# Patient Record
(demographics unavailable — no encounter records)

---

## 2024-10-24 NOTE — HISTORY OF PRESENT ILLNESS
[FreeTextEntry8] : Patient comes in because of cough with yellow thick sputum for approximately 8 days.  He went to the hospital and he was treated but he became slightly better and then he  lapsed.  He went yesterday to the emergency room and they gave him Mucinex but he continues being symptomatic.  She has also some wheezing.  No chest pain, shortness of breath, fever, paroxysmal nocturnal dyspnea or orthopnea

## 2024-10-24 NOTE — ASSESSMENT
[FreeTextEntry1] : Diagnosed #1 COPD exacerbation, bronchitis acute.  To be on p.o. antibiotics and steroids for 6 days.  Also drink lots of warm liquids.  If with any chest pain, shortness of breath to call or come back stat. 2.  Coronary artery disease, stable. 3.  Hypertension, stable. Plan return after a week if not better or earlier if worse.

## 2024-10-24 NOTE — PHYSICAL EXAM
[No Respiratory Distress] : no respiratory distress  [No Accessory Muscle Use] : no accessory muscle use [Normal Percussion] : the chest was normal to percussion [Pedal Pulses Present] : the pedal pulses are present [No Edema] : there was no peripheral edema [No Extremity Clubbing/Cyanosis] : no extremity clubbing/cyanosis [Normal] : affect was normal and insight and judgment were intact [de-identified] : With bilateral minimal wheezing and few rhonchi

## 2024-10-31 NOTE — HISTORY OF PRESENT ILLNESS
[FreeTextEntry1] : Patient presents today for diabetic pedal exam.  He states that he was in [place] and returned not feeling too well but overall, he states that he is doing OK.   Last hemoglobin A1c was 7.1% in May.  He has not seen his doctor since he returned from [place].  He denies any intermittent claudication type symptoms or tingling or numbness in his feet.  He presents wearing a loafer type shoe and states that he has elongated nails that he cannot cut himself.

## 2024-10-31 NOTE — HISTORY OF PRESENT ILLNESS
[Former] : former [>= 20 pack years] : >= 20 pack years [TextBox_4] : 84-year-old male with history of OAD, lung CVA presents for follow-up.  Patient was discharged from the hospital 2 weeksago where he was admitted for SOB and hypoxemia, treated for COPD exacerbation secondary to viral infection.  Chest x-ray at the time was negative.  Patient was discharged on tapering oral steroids with continued use of Breztri twice daily and albuterol as needed.  Patient continues to cough however with occasional SOB.  He denies fever, chills, chest pain, hemoptysis, night sweats or weight loss. [YearQuit] : 2000 [TextBox_29] : Denies snoring, daytime somnolence, apneic episodes, AM headaches

## 2024-10-31 NOTE — REVIEW OF SYSTEMS
[Cough] : cough [Sputum] : sputum [Dyspnea] : dyspnea [SOB on Exertion] : sob on exertion [GERD] : gerd [Negative] : Endocrine

## 2024-10-31 NOTE — PHYSICAL EXAM
[Varicose Veins Of Lower Extremities] : bilaterally [Ankle Swelling On The Right] : mild [1+] : left foot dorsalis pedis 1+ [Skin Color & Pigmentation] : normal skin color and pigmentation [Skin Turgor] : normal skin turgor [] : no rash [Skin Lesions] : no skin lesions [Ankle Swelling (On Exam)] : not present [FreeTextEntry3] : CFT: 3 seconds x10. Temperature gradient; within normal limits. [de-identified] : Muscle strength: 5/5, bilateral.  Mild hammertoe deformities 2 to 4, bilateral.   [Foot Ulcer] : no foot ulcer [Skin Induration] : no skin induration [FreeTextEntry4] : decreased at the 1st MPJ [FreeTextEntry1] : Light touch is intact, bilaterally.  5.07 [place]-Griselda monofilament wire is intact, bilateral.  [FreeTextEntry8] : decreased at the 1st MPJ

## 2024-10-31 NOTE — ASSESSMENT
[FreeTextEntry1] : Impression: Nail dystrophy (L60.3).  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  Treatment: Discussed findings and conditions with the patient.  He was encouraged to moisturize his feet daily; avoid moisturizing interdigitally.  Avoid walking barefoot.  Wear shoe gear in the garden.  Continue wearing properly fitting shoe gear.  He presents today wearing loafers with no socks.  Discussed use of socks.  All nails were prepped and trimmed to appropriate hygienic length.  He is to manually inspect his feet daily.   Any pain, problems or concerns, he is to contact the office.  Discussed the importance of glycemic control.

## 2024-10-31 NOTE — DISCUSSION/SUMMARY
[FreeTextEntry1] : 84-year-old male with history of COPD with related symptoms post exacerbation secondary to viral infection.  I reviewed the chest x-ray images that were performed while in the hospital and discussed the findings with the patient.  Given persistent complaints and findings prednisone 40 mg daily for 5 days was prescribed.  He is to continue Breztri and albuterol as before.  Follow-up chest CT as planned in the future.  He is to follow-up with his PMD as before.

## 2024-10-31 NOTE — PHYSICAL EXAM
[No Acute Distress] : no acute distress [Normal Oropharynx] : normal oropharynx [Normal Appearance] : normal appearance [No Neck Mass] : no neck mass [Normal Rate/Rhythm] : normal rate/rhythm [Normal S1, S2] : normal s1, s2 [No Murmurs] : no murmurs [No Resp Distress] : no resp distress [Rhonchi] : rhonchi [Surgical scars] : surgical scars [Benign] : benign [Normal Gait] : normal gait [No Clubbing] : no clubbing [No Cyanosis] : no cyanosis [No Edema] : no edema [FROM] : FROM [Normal Color/ Pigmentation] : normal color/ pigmentation [No Focal Deficits] : no focal deficits [Oriented x3] : oriented x3 [Normal Affect] : normal affect [TextBox_68] : Decreased breath sounds bilaterally

## 2024-10-31 NOTE — PHYSICAL EXAM
[Varicose Veins Of Lower Extremities] : bilaterally [Ankle Swelling On The Right] : mild [1+] : left foot dorsalis pedis 1+ [Skin Color & Pigmentation] : normal skin color and pigmentation [Skin Turgor] : normal skin turgor [] : no rash [Skin Lesions] : no skin lesions [Ankle Swelling (On Exam)] : not present [FreeTextEntry3] : CFT: 3 seconds x10. Temperature gradient; within normal limits. [de-identified] : Muscle strength: 5/5, bilateral.  Mild hammertoe deformities 2 to 4, bilateral.   [Foot Ulcer] : no foot ulcer [Skin Induration] : no skin induration [FreeTextEntry4] : decreased at the 1st MPJ [FreeTextEntry1] : Light touch is intact, bilaterally.  5.07 [place]-Griselda monofilament wire is intact, bilateral.  [FreeTextEntry8] : decreased at the 1st MPJ

## 2024-11-06 NOTE — ASSESSMENT
[FreeTextEntry1] : Mr. KD JORGE, 84 year old male, smoker, w/ hx of HTN, DMT2, TIA, mild MI in 2000 s/p 1 stent, bladder CA s/p removal in 2018, skin CA (Rt forearm) s/p excision 05/2023, and lung CA.  Now 1 yr s/p Rt VATS, R.A., RULobectomy, MLND on 10/4/23. 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  Patient returned from [facility] on Friday (10/11) and was admitted on 10/15/2024 for COPD exacerbation and severe sepsis 2/2 Rhinovirus infection. Patient was started on azithromycin, duonebs, prednisone, and mucinex for COPD exacerbation. F/u with PCP on 10/24/2024, he became slightly better after discharge and then he lapsed. He went to the emergency room on 10/23/2024 and they gave him Mucinex but he continues being symptomatic. He has also some wheezing. PCP gave a course of Azithromycin and Prednisone. On 10/31/2024, she f/u with Dr. Heller, Given persistent complaints and findings prednisone 40 mg daily for 5 days was prescribed.  CT chest on 11/06/2024:  -   I have reviewed the patient's medical records and diagnostic images at time of this office consultation and have made the following recommendation: 1.

## 2024-11-06 NOTE — HISTORY OF PRESENT ILLNESS
[FreeTextEntry1] : Mr. KD JORGE, 84 year old male, former smoker, w/ hx of HTN, DMT2, TIA, mild MI in 2000 s/p 1 stent, bladder CA s/p removal in 2018, skin CA (Rt forearm) s/p excision 05/2023, who presented to PCP c/o SOB, sent for CXR then CT scan.  PET/CT on 2/13/23: - FDG-avid AP window lymph node, SUV 4.6, 1.2 x 0.8 cm, image 86. - FDG-avid left hilar focus on the SUV 2.9, difficult to measure on corresponding CT, image 95. - No FDG-avid right hilar lymphadenopathy evident. - FDG-avid RUL groundglass opacity, SUV 4.2 in the more solid component anteriorly, measuring approximately 4.2 x 2.9 cm, image 77. - Emphysema.  MRI, MRA of the brain, and MRA of the neck on 5/19/23: - MRI BRAIN: No acute infarction. No abnormal enhancement. - MRA BRAIN: Unremarkable. - MRA NECK: Limited study. Unremarkable.  CT Chest on 6/6/23 at [facility]: - Mild interstitial disease is noted, manifested by fine peripheral and groundglass opacities, slightly worse at the lung bases, not significantly changed. - Mild to moderate degree of emphysematous changes are visualized, worse involving the upper lobes. Multiple bilateral calcified granulomas are seen. - a 4.5cm groundglass opacity is visualized within the RUL laterally, which is difficult to measure due to its irregular shape and ill-defined nature, (4: 93), not significantly changed in size. Possible solid components is visualized, measuring approximately 5 mm (4: 89), not definitively seen previously. - a right adrenal nodule is visualized, measuring 1.7 cm and 8 Hounsfield units, compatible with an adenoma, not particularly changed in size, which is benign, not requiring follow-up examinations.  PFTs on 7/17/23: FVC 81%, FEV1 49%  He is s/p Navigational Bronch EBUS biopsy on 8/8/23 by Dr. Schwartz. Path revealed AdenoCA of the RUL lesion. PD-L1 0%. Level 10 LN non-diagnostic. RUL BAL showed atypical findings.  PFTs on 8/22/23: FVC 60%, FEV1 63%, DLCO 73%  Now 1 yr s/p Rt VATS, R.A., RULobectomy, MLND on 10/4/23. Path revealed invasive papillary adenocarcinoma, 3.5 cm, G2, all margins and LNs are negative, [id number], Stg IA3.  CXR on 10/25/23: Small right hydropneumothorax at the right apex. Increased small right pleural effusion.  CXR on 12/7/23: Small loculated right effusion unchanged from the last study although the air-fluid level in the right apex no longer seen with just an opacity remaining likely pleural thickening.  Of note, patient s/p PPM placement beginning of Feb 2024.  CT Angio Chest w/wo on 2/16/24 at [facility]: - no P.E. - post-op changes  - Rt pleural effusion - peribronchial soft tissue thickening in the Rt anterior hilum, suspicious  PET/CT on 4/11/2024: - RUL post surgical changes and scarring  - along with suture line, there is tubular shaped soft tissue nodule on image 58 measuring 2.3x1.5 cm with SUV max 2.4 - there is also linear opacities at the right lower lung which could reflect scarring and additional postsurgical changes with SUV max 2 - small right pleural effusion - 2 mm left upper lobe solid nodule image 59 - emphysema  On 05/02/2024, PET scan reviewed and discussed with patient, stable postop findings. Recommended to repeat CT scan in 6 months for surveillance.   Patient returned from [facility] on Friday (10/11) and was admitted on 10/15/2024 for COPD exacerbation and severe sepsis 2/2 Rhinovirus infection. Patient was started on azithromycin, duonebs, prednisone, and mucinex for COPD exacerbation. F/u with PCP on 10/24/2024, he became slightly better after discharge and then he lapsed. He went to the emergency room on 10/23/2024 and they gave him Mucinex but he continues being symptomatic. He has also some wheezing. PCP gave a course of Azithromycin and Prednisone. On 10/31/2024, she f/u with Dr. Heller, Given persistent complaints and findings prednisone 40 mg daily for 5 days was prescribed.  CT chest on 11/06/2024:  -   Patient is here today for a follow up.

## 2024-11-06 NOTE — CONSULT LETTER
[Consult Letter:] : I had the pleasure of evaluating your patient, [unfilled]. [( Thank you for referring [unfilled] for consultation for _____ )] : Thank you for referring [unfilled] for consultation for [unfilled] [Please see my note below.] : Please see my note below. [Consult Closing:] : Thank you very much for allowing me to participate in the care of this patient.  If you have any questions, please do not hesitate to contact me. [Sincerely,] : Sincerely, [FreeTextEntry2] : Dr. Michael Schwartz (IP/Referring) Dr. Jacoby Heller (Pulm) Dr. Anuradha Sanchez (Hem/Onc) Dr. Edwni Ramos (Uro) Dr. Analilia Tavarez (PCP) [FreeTextEntry3] : Asad Ace MD, MPH  System Director of Thoracic Surgery  Director of Comprehensive Lung and Foregut [place]  Professor Cardiovascular & Thoracic Surgery   [employer] at [facility] [phone] [place] [address] [place] [address] Tel: [phone] Fax: [phone]

## 2024-11-14 NOTE — ASSESSMENT
[FreeTextEntry1] : Mr. KD JORGE, 84 year old male, smoker, w/ hx of HTN, DMT2, TIA, mild MI in 2000 s/p 1 stent, bladder CA s/p removal in 2018, skin CA (Rt forearm) s/p excision 05/2023, and lung CA.  Now 1 yr s/p Rt VATS, R.A., RULobectomy, MLND on 10/4/23. Path revealed invasive papillary adenocarcinoma, 3.5 cm, G2, all margins and LNs are negative, [id number], Stg IA3.   I have reviewed the patient's medical records and diagnostic images at time of this office consultation and have made the following recommendation: 1. Awaiting official read on CT scan 2. RTC in 6 months with CT Chest w/o contrast     I, Dr. DIXON, AICHA AYALA, personally performed the evaluation and management (E/M) services for this established patient who presents today with (a) new problem(s)/exacerbation of (an) existing condition(s). That E/M includes conducting the examination, assessing all new/exacerbated conditions, and establishing a new plan of care. Today, my ACP, Jeremy Schneider NP was here to observe my evaluation and management services for this new problem/exacerbated condition to be followed going forward.

## 2024-11-14 NOTE — CONSULT LETTER
[FreeTextEntry2] : Dr. Michael Schwartz (IP/Referring) Dr. Jacoby Heller (Pulm) Dr. Anuradha Sanchez (Hem/Onc) Dr. Edwin Ramos (Uro) Dr. Analilia Tavarez (PCP) [FreeTextEntry3] : Asad Ace MD, MPH  System Director of Thoracic Surgery  Director of Comprehensive Lung and Foregut [place]  Professor Cardiovascular & Thoracic Surgery   [facility] [employer] at [facility] [phone] [place] [address] [place] [address] Tel: [phone] Fax: [phone]

## 2024-11-14 NOTE — HISTORY OF PRESENT ILLNESS
[FreeTextEntry1] : Mr. KD JORGE, 84 year old male, former smoker, w/ hx of HTN, DMT2, TIA, mild MI in 2000 s/p 1 stent, bladder CA s/p removal in 2018, skin CA (Rt forearm) s/p excision 05/2023, who presented to PCP c/o SOB, sent for CXR then CT scan.  PET/CT on 2/13/23: - FDG-avid AP window lymph node, SUV 4.6, 1.2 x 0.8 cm, image 86. - FDG-avid left hilar focus on the SUV 2.9, difficult to measure on corresponding CT, image 95. - No FDG-avid right hilar lymphadenopathy evident. - FDG-avid RUL groundglass opacity, SUV 4.2 in the more solid component anteriorly, measuring approximately 4.2 x 2.9 cm, image 77. - Emphysema.  MRI, MRA of the brain, and MRA of the neck on 5/19/23: - MRI BRAIN: No acute infarction. No abnormal enhancement. - MRA BRAIN: Unremarkable. - MRA NECK: Limited study. Unremarkable.  CT Chest on 6/6/23 at [facility]: - Mild interstitial disease is noted, manifested by fine peripheral and groundglass opacities, slightly worse at the lung bases, not significantly changed. - Mild to moderate degree of emphysematous changes are visualized, worse involving the upper lobes. Multiple bilateral calcified granulomas are seen. - a 4.5cm groundglass opacity is visualized within the RUL laterally, which is difficult to measure due to its irregular shape and ill-defined nature, (4: 93), not significantly changed in size. Possible solid components is visualized, measuring approximately 5 mm (4: 89), not definitively seen previously. - a right adrenal nodule is visualized, measuring 1.7 cm and 8 Hounsfield units, compatible with an adenoma, not particularly changed in size, which is benign, not requiring follow-up examinations.  PFTs on 7/17/23: FVC 81%, FEV1 49%  He is s/p Navigational Bronch EBUS biopsy on 8/8/23 by Dr. Schwartz. Path revealed AdenoCA of the RUL lesion. PD-L1 0%. Level 10 LN non-diagnostic. RUL BAL showed atypical findings.  PFTs on 8/22/23: FVC 60%, FEV1 63%, DLCO 73%  Now 1 yr s/p Rt VATS, R.A., RULobectomy, MLND on 10/4/23. Path revealed invasive papillary adenocarcinoma, 3.5 cm, G2, all margins and LNs are negative, [id number], Stg IA3.  Of note, patient s/p PPM placement beginning of Feb 2024.  CT Angio Chest w/wo on 2/16/24 at [facility]: - no P.E. - post-op changes  - Rt pleural effusion - peribronchial soft tissue thickening in the Rt anterior hilum, suspicious  PET/CT on 4/11/2024: - RUL post surgical changes and scarring  - along with suture line, there is tubular shaped soft tissue nodule on image 58 measuring 2.3x1.5 cm with SUV max 2.4 - there is also linear opacities at the right lower lung which could reflect scarring and additional postsurgical changes with SUV max 2 - small right pleural effusion - 2 mm left upper lobe solid nodule image 59 - emphysema  On 05/02/2024, PET scan reviewed and discussed with patient, stable postop findings. Recommended to repeat CT scan in 6 months for surveillance.   Patient returned from [facility] on Friday (10/11) and was admitted on 10/15/2024 for COPD exacerbation and severe sepsis 2/2 Rhinovirus infection. Patient was started on azithromycin, duonebs, prednisone, and mucinex for COPD exacerbation. F/u with PCP on 10/24/2024, he became slightly better after discharge and then he lapsed. He went to the emergency room on 10/23/2024 and they gave him Mucinex but he continues being symptomatic. He has also some wheezing. PCP gave a course of Azithromycin and Prednisone. On 10/31/2024, she f/u with Dr. Heller, Given persistent complaints and findings prednisone 40 mg daily for 5 days was prescribed.  CT chest on 11/06/2024:  - official report pending  Patient is here today for a follow up.  patient came back from [facility], and had a cold/cough, went to [facility] ED, hospitalized for pleural effusion, s/p IV antibiotics and steroid, had followed up with Dr. Heller.

## 2024-11-14 NOTE — ASSESSMENT
[FreeTextEntry1] : Mr. KD JORGE, 84 year old male, smoker, w/ hx of HTN, DMT2, TIA, mild MI in 2000 s/p 1 stent, bladder CA s/p removal in 2018, skin CA (Rt forearm) s/p excision 05/2023, and lung CA.  Now 1 yr s/p Rt VATS, R.A., RULobectomy, MLND on 10/4/23. Path revealed invasive papillary adenocarcinoma, 3.5 cm, G2, all margins and LNs are negative, [id number], Stg IA3.   I have reviewed the patient's medical records and diagnostic images at time of this office consultation and have made the following recommendation: 1. Awaiting official read on CT scan 2. RTC in 6 months with CT Chest w/o contrast     I, Dr. DIXON, AICHA AYALA, personally performed the evaluation and management (E/M) services for this established patient who presents today with (a) new problem(s)/exacerbation of (an) existing condition(s). That E/M includes conducting the examination, assessing all new/exacerbated conditions, and establishing a new plan of care. Today, my ACP, Jeremy Schneider NP was here to observe my evaluation and management services for this new problem/exacerbated condition to be followed going forward.

## 2024-11-14 NOTE — ASSESSMENT
[FreeTextEntry1] : Mr. KD JORGE, 84 year old male, smoker, w/ hx of HTN, DMT2, TIA, mild MI in 2000 s/p 1 stent, bladder CA s/p removal in 2018, skin CA (Rt forearm) s/p excision 05/2023, and lung CA.  Now 1 yr s/p Rt VATS, R.A., RULobectomy, MLND on 10/4/23. Path revealed invasive papillary adenocarcinoma, 3.5 cm, G2, all margins and LNs are negative, [id number], Stg IA3.   I have reviewed the patient's medical records and diagnostic images at time of this office consultation and have made the following recommendation: 1. Awaiting official read on CT scan 2. RTC in 6 months with CT Chest w/o contrast     I, Dr. DIXON, AICHA AYALA, personally performed the evaluation and management (E/M) services for this established patient who presents today with (a) new problem(s)/exacerbation of (an) existing condition(s). That E/M includes conducting the examination, assessing all new/exacerbated conditions, and establishing a new plan of care. Today, my ACP, Jeremy Schneider NP was here to observe my evaluation and management services for this new problem/exacerbated condition to be followed going forward.

## 2024-11-14 NOTE — HISTORY OF PRESENT ILLNESS
[FreeTextEntry1] : Mr. KD JORGE, 84 year old male, former smoker, w/ hx of HTN, DMT2, TIA, mild MI in 2000 s/p 1 stent, bladder CA s/p removal in 2018, skin CA (Rt forearm) s/p excision 05/2023, who presented to PCP c/o SOB, sent for CXR then CT scan.  PET/CT on 2/13/23: - FDG-avid AP window lymph node, SUV 4.6, 1.2 x 0.8 cm, image 86. - FDG-avid left hilar focus on the SUV 2.9, difficult to measure on corresponding CT, image 95. - No FDG-avid right hilar lymphadenopathy evident. - FDG-avid RUL groundglass opacity, SUV 4.2 in the more solid component anteriorly, measuring approximately 4.2 x 2.9 cm, image 77. - Emphysema.  MRI, MRA of the brain, and MRA of the neck on 5/19/23: - MRI BRAIN: No acute infarction. No abnormal enhancement. - MRA BRAIN: Unremarkable. - MRA NECK: Limited study. Unremarkable.  CT Chest on 6/6/23 at [facility]: - Mild interstitial disease is noted, manifested by fine peripheral and groundglass opacities, slightly worse at the lung bases, not significantly changed. - Mild to moderate degree of emphysematous changes are visualized, worse involving the upper lobes. Multiple bilateral calcified granulomas are seen. - a 4.5cm groundglass opacity is visualized within the RUL laterally, which is difficult to measure due to its irregular shape and ill-defined nature, (4: 93), not significantly changed in size. Possible solid components is visualized, measuring approximately 5 mm (4: 89), not definitively seen previously. - a right adrenal nodule is visualized, measuring 1.7 cm and 8 Hounsfield units, compatible with an adenoma, not particularly changed in size, which is benign, not requiring follow-up examinations.  PFTs on 7/17/23: FVC 81%, FEV1 49%  He is s/p Navigational Bronch EBUS biopsy on 8/8/23 by Dr. Schwartz. Path revealed AdenoCA of the RUL lesion. PD-L1 0%. Level 10 LN non-diagnostic. RUL BAL showed atypical findings.  PFTs on 8/22/23: FVC 60%, FEV1 63%, DLCO 73%  Now 1 yr s/p Rt VATS, R.A., RULobectomy, MLND on 10/4/23. Path revealed invasive papillary adenocarcinoma, 3.5 cm, G2, all margins and LNs are negative, [id number], Stg IA3.  Of note, patient s/p PPM placement beginning of Feb 2024.  CT Angio Chest w/wo on 2/16/24 at [place]: - no P.E. - post-op changes  - Rt pleural effusion - peribronchial soft tissue thickening in the Rt anterior hilum, suspicious  PET/CT on 4/11/2024: - RUL post surgical changes and scarring  - along with suture line, there is tubular shaped soft tissue nodule on image 58 measuring 2.3x1.5 cm with SUV max 2.4 - there is also linear opacities at the right lower lung which could reflect scarring and additional postsurgical changes with SUV max 2 - small right pleural effusion - 2 mm left upper lobe solid nodule image 59 - emphysema  On 05/02/2024, PET scan reviewed and discussed with patient, stable postop findings. Recommended to repeat CT scan in 6 months for surveillance.   Patient returned from [facility] on Friday (10/11) and was admitted on 10/15/2024 for COPD exacerbation and severe sepsis 2/2 Rhinovirus infection. Patient was started on azithromycin, duonebs, prednisone, and mucinex for COPD exacerbation. F/u with PCP on 10/24/2024, he became slightly better after discharge and then he lapsed. He went to the emergency room on 10/23/2024 and they gave him Mucinex but he continues being symptomatic. He has also some wheezing. PCP gave a course of Azithromycin and Prednisone. On 10/31/2024, she f/u with Dr. Heller, Given persistent complaints and findings prednisone 40 mg daily for 5 days was prescribed.  CT chest on 11/06/2024:  - official report pending  Patient is here today for a follow up.  patient came back from [facility], and had a cold/cough, went to [facility] ED, hospitalized for pleural effusion, s/p IV antibiotics and steroid, had followed up with Dr. Heller.

## 2024-11-14 NOTE — CONSULT LETTER
[FreeTextEntry2] : Dr. Michael Schwartz (IP/Referring) Dr. Jacoby Heller (Pulm) Dr. Anuradha Sanchez (Hem/Onc) Dr. Edwin Ramos (Uro) Dr. Analilia Tavarez (PCP) [FreeTextEntry3] : Asad Ace MD, MPH  System Director of Thoracic Surgery  Director of Comprehensive Lung and Foregut [place]  Professor Cardiovascular & Thoracic Surgery   [facility] [employer] at [facility] [address] [place] [address] [place] [address] Tel: [phone] Fax: [phone]

## 2024-11-14 NOTE — ASSESSMENT
[FreeTextEntry1] : Mr. KD JORGE, 84 year old male, smoker, w/ hx of HTN, DMT2, TIA, mild MI in 2000 s/p 1 stent, bladder CA s/p removal in 2018, skin CA (Rt forearm) s/p excision 05/2023, and lung CA.  Now 1 yr s/p Rt VATS, R.A., RULobectomy, MLND on 10/4/23. Path revealed invasive papillary adenocarcinoma, 3.5 cm, G2, all margins and LNs are negative, [id number], Stg IA3.   I have reviewed the patient's medical records and diagnostic images at time of this office consultation and have made the following recommendation: 1. Awaiting official read on CT scan 2. RTC in 6 months with CT Chest w/o contrast     I, Dr. DIXON, AICHA AYALA, personally performed the evaluation and management (E/M) services for this established patient who presents today with (a) new problem(s)/exacerbation of (an) existing condition(s). That E/M includes conducting the examination, assessing all new/exacerbated conditions, and establishing a new plan of care. Today, my ACP, Jeremy Schneider NP was here to observe my evaluation and management services for this new problem/exacerbated condition to be followed going forward.

## 2024-11-14 NOTE — HISTORY OF PRESENT ILLNESS
[FreeTextEntry1] : Mr. KD JORGE, 84 year old male, former smoker, w/ hx of HTN, DMT2, TIA, mild MI in 2000 s/p 1 stent, bladder CA s/p removal in 2018, skin CA (Rt forearm) s/p excision 05/2023, who presented to PCP c/o SOB, sent for CXR then CT scan.  PET/CT on 2/13/23: - FDG-avid AP window lymph node, SUV 4.6, 1.2 x 0.8 cm, image 86. - FDG-avid left hilar focus on the SUV 2.9, difficult to measure on corresponding CT, image 95. - No FDG-avid right hilar lymphadenopathy evident. - FDG-avid RUL groundglass opacity, SUV 4.2 in the more solid component anteriorly, measuring approximately 4.2 x 2.9 cm, image 77. - Emphysema.  MRI, MRA of the brain, and MRA of the neck on 5/19/23: - MRI BRAIN: No acute infarction. No abnormal enhancement. - MRA BRAIN: Unremarkable. - MRA NECK: Limited study. Unremarkable.  CT Chest on 6/6/23 at [facility]: - Mild interstitial disease is noted, manifested by fine peripheral and groundglass opacities, slightly worse at the lung bases, not significantly changed. - Mild to moderate degree of emphysematous changes are visualized, worse involving the upper lobes. Multiple bilateral calcified granulomas are seen. - a 4.5cm groundglass opacity is visualized within the RUL laterally, which is difficult to measure due to its irregular shape and ill-defined nature, (4: 93), not significantly changed in size. Possible solid components is visualized, measuring approximately 5 mm (4: 89), not definitively seen previously. - a right adrenal nodule is visualized, measuring 1.7 cm and 8 Hounsfield units, compatible with an adenoma, not particularly changed in size, which is benign, not requiring follow-up examinations.  PFTs on 7/17/23: FVC 81%, FEV1 49%  He is s/p Navigational Bronch EBUS biopsy on 8/8/23 by Dr. Schwartz. Path revealed AdenoCA of the RUL lesion. PD-L1 0%. Level 10 LN non-diagnostic. RUL BAL showed atypical findings.  PFTs on 8/22/23: FVC 60%, FEV1 63%, DLCO 73%  Now 1 yr s/p Rt VATS, R.A., RULobectomy, MLND on 10/4/23. Path revealed invasive papillary adenocarcinoma, 3.5 cm, G2, all margins and LNs are negative, [id number], Stg IA3.  Of note, patient s/p PPM placement beginning of Feb 2024.  CT Angio Chest w/wo on 2/16/24 at [place]: - no P.E. - post-op changes  - Rt pleural effusion - peribronchial soft tissue thickening in the Rt anterior hilum, suspicious  PET/CT on 4/11/2024: - RUL post surgical changes and scarring  - along with suture line, there is tubular shaped soft tissue nodule on image 58 measuring 2.3x1.5 cm with SUV max 2.4 - there is also linear opacities at the right lower lung which could reflect scarring and additional postsurgical changes with SUV max 2 - small right pleural effusion - 2 mm left upper lobe solid nodule image 59 - emphysema  On 05/02/2024, PET scan reviewed and discussed with patient, stable postop findings. Recommended to repeat CT scan in 6 months for surveillance.   Patient returned from [facility] on Friday (10/11) and was admitted on 10/15/2024 for COPD exacerbation and severe sepsis 2/2 Rhinovirus infection. Patient was started on azithromycin, duonebs, prednisone, and mucinex for COPD exacerbation. F/u with PCP on 10/24/2024, he became slightly better after discharge and then he lapsed. He went to the emergency room on 10/23/2024 and they gave him Mucinex but he continues being symptomatic. He has also some wheezing. PCP gave a course of Azithromycin and Prednisone. On 10/31/2024, she f/u with Dr. Heller, Given persistent complaints and findings prednisone 40 mg daily for 5 days was prescribed.  CT chest on 11/06/2024:  - official report pending  Patient is here today for a follow up.  patient came back from [facility], and had a cold/cough, went to [facility] ED, hospitalized for pleural effusion, s/p IV antibiotics and steroid, had followed up with Dr. Heller.

## 2024-11-14 NOTE — HISTORY OF PRESENT ILLNESS
[FreeTextEntry1] : Mr. KD JORGE, 84 year old male, former smoker, w/ hx of HTN, DMT2, TIA, mild MI in 2000 s/p 1 stent, bladder CA s/p removal in 2018, skin CA (Rt forearm) s/p excision 05/2023, who presented to PCP c/o SOB, sent for CXR then CT scan.  PET/CT on 2/13/23: - FDG-avid AP window lymph node, SUV 4.6, 1.2 x 0.8 cm, image 86. - FDG-avid left hilar focus on the SUV 2.9, difficult to measure on corresponding CT, image 95. - No FDG-avid right hilar lymphadenopathy evident. - FDG-avid RUL groundglass opacity, SUV 4.2 in the more solid component anteriorly, measuring approximately 4.2 x 2.9 cm, image 77. - Emphysema.  MRI, MRA of the brain, and MRA of the neck on 5/19/23: - MRI BRAIN: No acute infarction. No abnormal enhancement. - MRA BRAIN: Unremarkable. - MRA NECK: Limited study. Unremarkable.  CT Chest on 6/6/23 at [facility]: - Mild interstitial disease is noted, manifested by fine peripheral and groundglass opacities, slightly worse at the lung bases, not significantly changed. - Mild to moderate degree of emphysematous changes are visualized, worse involving the upper lobes. Multiple bilateral calcified granulomas are seen. - a 4.5cm groundglass opacity is visualized within the RUL laterally, which is difficult to measure due to its irregular shape and ill-defined nature, (4: 93), not significantly changed in size. Possible solid components is visualized, measuring approximately 5 mm (4: 89), not definitively seen previously. - a right adrenal nodule is visualized, measuring 1.7 cm and 8 Hounsfield units, compatible with an adenoma, not particularly changed in size, which is benign, not requiring follow-up examinations.  PFTs on 7/17/23: FVC 81%, FEV1 49%  He is s/p Navigational Bronch EBUS biopsy on 8/8/23 by Dr. Schwartz. Path revealed AdenoCA of the RUL lesion. PD-L1 0%. Level 10 LN non-diagnostic. RUL BAL showed atypical findings.  PFTs on 8/22/23: FVC 60%, FEV1 63%, DLCO 73%  Now 1 yr s/p Rt VATS, R.A., RULobectomy, MLND on 10/4/23. Path revealed invasive papillary adenocarcinoma, 3.5 cm, G2, all margins and LNs are negative, [id number], Stg IA3.  Of note, patient s/p PPM placement beginning of Feb 2024.  CT Angio Chest w/wo on 2/16/24 at [place]: - no P.E. - post-op changes  - Rt pleural effusion - peribronchial soft tissue thickening in the Rt anterior hilum, suspicious  PET/CT on 4/11/2024: - RUL post surgical changes and scarring  - along with suture line, there is tubular shaped soft tissue nodule on image 58 measuring 2.3x1.5 cm with SUV max 2.4 - there is also linear opacities at the right lower lung which could reflect scarring and additional postsurgical changes with SUV max 2 - small right pleural effusion - 2 mm left upper lobe solid nodule image 59 - emphysema  On 05/02/2024, PET scan reviewed and discussed with patient, stable postop findings. Recommended to repeat CT scan in 6 months for surveillance.   Patient returned from [facility] on Friday (10/11) and was admitted on 10/15/2024 for COPD exacerbation and severe sepsis 2/2 Rhinovirus infection. Patient was started on azithromycin, duonebs, prednisone, and mucinex for COPD exacerbation. F/u with PCP on 10/24/2024, he became slightly better after discharge and then he lapsed. He went to the emergency room on 10/23/2024 and they gave him Mucinex but he continues being symptomatic. He has also some wheezing. PCP gave a course of Azithromycin and Prednisone. On 10/31/2024, she f/u with Dr. Heller, Given persistent complaints and findings prednisone 40 mg daily for 5 days was prescribed.  CT chest on 11/06/2024:  - official report pending  Patient is here today for a follow up.  patient came back from [facility], and had a cold/cough, went to [facility] ED, hospitalized for pleural effusion, s/p IV antibiotics and steroid, had followed up with Dr. Heller.

## 2024-11-14 NOTE — CONSULT LETTER
[FreeTextEntry2] : Dr. Michael Schwartz (IP/Referring) Dr. Jacoby Heller (Pulm) Dr. Anuradha Sanchez (Hem/Onc) Dr. Edwin Ramos (Uro) Dr. Analilia Tavarez (PCP) [FreeTextEntry3] : Asad Ace MD, MPH  System Director of Thoracic Surgery  Director of Comprehensive Lung and Foregut [place]  Professor Cardiovascular & Thoracic Surgery   [facility] at [facility] [phone] [place] [address] [place] [address] Tel: [phone] Fax: [phone]

## 2024-11-14 NOTE — CONSULT LETTER
[FreeTextEntry2] : Dr. Michael Schwartz (IP/Referring) Dr. Jacoby Heller (Pulm) Dr. Anuradha Sanchez (Hem/Onc) Dr. Edwin Ramos (Uro) Dr. Analilia Tavarez (PCP) [FreeTextEntry3] : Asad Ace MD, MPH  System Director of Thoracic Surgery  Director of Comprehensive Lung and Foregut [place]  Professor Cardiovascular & Thoracic Surgery   [facility] [employer] [facility] at [facility] [phone] [place] [address] [place] [address] Tel: [phone] Fax: [phone]

## 2025-01-09 NOTE — PHYSICAL EXAM
[Normal] : affect was normal and insight and judgment were intact [de-identified] : Tender and stiff left knee without swelling or redness

## 2025-01-09 NOTE — ASSESSMENT
[FreeTextEntry1] : Diagnosis #1 left knee pain most likely secondary to joint degenerative arthritis.  To take Tylenol 500 mg p.o. every 6 hours as needed and have orthopedic consult 2.  Hypertension, same treatment and low-salt diet.  CMP to lab to check electrolytes and kidney function 3.  Adult onset diabetes, current treatment and diet.  A1c to lab 4.  Coronary artery disease, stable. Plan.  Return after 6 weeks

## 2025-01-09 NOTE — HISTORY OF PRESENT ILLNESS
[FreeTextEntry8] : Patient comes because of left knee pain the last few days.  No swelling redness or known injuries.  He took meloxicam the last 2 days with some improvement but bothers his stomach.  He wants to check also blood pressure and diabetes.  No other symptoms

## 2025-01-24 NOTE — DISCUSSION/SUMMARY
[de-identified] : 84 y/o male with left knee pain.  The patient presents for evaluation of degenerative knee pain. Presentation is consistent with early degenerative change and arthrosis to the knee. Described that this is likely due to overuse, and age-related "wear and tear". Pain may be related to breakdown of the chondral surfaces, cartilage, or ligaments of the knee. Radiographs shows mild tricompartmental OA and chondrocalcinosis.   Recommendation: Begin a trial of PT. Rx given. Conservative care & observation; including rest/activity avoidance until less symptomatic with subsequent gradual return to full low impact activity as tolerated. Patient may also use Meloxicam Rx given or OTC acetaminophen as tolerated, with application of ice/heat to the area 2-3x daily for 20 minutes after periods of activity.   Follow-up as needed if pain persists for further evaluation and treatment.

## 2025-01-24 NOTE — DISCUSSION/SUMMARY
[de-identified] : 84 y/o male with left knee pain.  The patient presents for evaluation of degenerative knee pain. Presentation is consistent with early degenerative change and arthrosis to the knee. Described that this is likely due to overuse, and age-related "wear and tear". Pain may be related to breakdown of the chondral surfaces, cartilage, or ligaments of the knee. Radiographs shows mild tricompartmental OA and chondrocalcinosis.   Recommendation: Begin a trial of PT. Rx given. Conservative care & observation; including rest/activity avoidance until less symptomatic with subsequent gradual return to full low impact activity as tolerated. Patient may also use Meloxicam Rx given or OTC acetaminophen as tolerated, with application of ice/heat to the area 2-3x daily for 20 minutes after periods of activity.   Follow-up as needed if pain persists for further evaluation and treatment.

## 2025-01-24 NOTE — HISTORY OF PRESENT ILLNESS
[de-identified] : 85-year-old male presents today with left knee pain x 1 month. No injury reported. The pain is a constant ache. Patient reports pain is worse with walking, flexion, and getting in and out of car. He also reports radiation of pain to the lower extremity. He is not taking pain medication.

## 2025-01-24 NOTE — PHYSICAL EXAM
[de-identified] : Left knee exam  Skin: Clean, dry, intact Inspection: No obvious malalignment, no masses, +minimal swelling, no effusion Pulses: 2+ DP/PT pulses ROM: 5-125 degrees of flexion. +pain with deep knee flexion/extension. Tenderness: No MJLT. + LJLT. No pain over the patella facets. No pain to the quadriceps tendon. No pain to the patella tendon. No posterior knee tenderness. Stability: Stable to varus, valgus. Negative lachman testing. Negative anterior drawer, negative posterior drawer. Strength: 5/5 Q/H/TA/GS/EHL, without atrophy Neuro: In tact to light touch throughout, DTR's normal Additional tests: Negative McMurrays test, Negative patellar grind test.  [de-identified] : The following radiographs were ordered and read by me during this patients visit. I reviewed each radiograph in detail with the patient and discussed the findings as highlighted below.   4 views of the left knee were obtained, 01/21/2025, that show no acute fracture or dislocation. There is mild medial, mild lateral and mild patellofemoral degenerative changes seen. There is no significant malalignment. Chondrocalcinosis

## 2025-01-24 NOTE — ADDENDUM
[FreeTextEntry1] : This note was written by Merry Coelho on 01/21/2025 acting solely as a scribe for Dr. Rai Sierra.   All medical record entries made by the Scribe were at my, Dr. Rai Sierra, direction and personally dictated by me on 01/21/2025. I have personally reviewed the chart and agree that the record accurately reflects my personal performance of the history, physical exam, assessment and plan.

## 2025-01-24 NOTE — PHYSICAL EXAM
[de-identified] : Left knee exam  Skin: Clean, dry, intact Inspection: No obvious malalignment, no masses, +minimal swelling, no effusion Pulses: 2+ DP/PT pulses ROM: 5-125 degrees of flexion. +pain with deep knee flexion/extension. Tenderness: No MJLT. + LJLT. No pain over the patella facets. No pain to the quadriceps tendon. No pain to the patella tendon. No posterior knee tenderness. Stability: Stable to varus, valgus. Negative lachman testing. Negative anterior drawer, negative posterior drawer. Strength: 5/5 Q/H/TA/GS/EHL, without atrophy Neuro: In tact to light touch throughout, DTR's normal Additional tests: Negative McMurrays test, Negative patellar grind test.  [de-identified] : The following radiographs were ordered and read by me during this patients visit. I reviewed each radiograph in detail with the patient and discussed the findings as highlighted below.   4 views of the left knee were obtained, 01/21/2025, that show no acute fracture or dislocation. There is mild medial, mild lateral and mild patellofemoral degenerative changes seen. There is no significant malalignment. Chondrocalcinosis

## 2025-01-24 NOTE — HISTORY OF PRESENT ILLNESS
[de-identified] : 85-year-old male presents today with left knee pain x 1 month. No injury reported. The pain is a constant ache. Patient reports pain is worse with walking, flexion, and getting in and out of car. He also reports radiation of pain to the lower extremity. He is not taking pain medication.  O-T Plasty Text: The defect edges were debeveled with a #15 scalpel blade.  Given the location of the defect, shape of the defect and the proximity to free margins an O-T plasty was deemed most appropriate.  Using a sterile surgical marker, an appropriate O-T plasty was drawn incorporating the defect and placing the expected incisions within the relaxed skin tension lines where possible.    The area thus outlined was incised deep to adipose tissue with a #15 scalpel blade.  The skin margins were undermined to an appropriate distance in all directions utilizing iris scissors.

## 2025-02-01 NOTE — DISCUSSION/SUMMARY
[FreeTextEntry1] : 85-year-old male with history of COPD and lung CA complaining of increasing SPRINGER with productive cough, secondary to exacerbation.  Prednisone 40 mg daily for 5 days was prescribed.  He is to continue use of Breztri twice daily and initiate use of albuterol at least 2-3 times daily for now.  PFTs will be repeated in the future diet weight loss and exercise were also stressed.  He is to follow-up with his thoracic surgeon and PMD as before.

## 2025-02-01 NOTE — HISTORY OF PRESENT ILLNESS
[>= 20 pack years] : >= 20 pack years [TextBox_4] : 85-year-old male with history of COPD and lung CA, presents for follow-up.  The patient complains of increasing SPRINGER associated with productive cough for a few months.  He denies fever, chills, chest pain, hemoptysis or night sweats but has gained weight.  He  continues to use Breztri twice daily without albuterol use however. [YearQuit] : 2000 [TextBox_29] : Denies snoring, daytime somnolence, apneic episodes, AM headaches

## 2025-02-01 NOTE — REVIEW OF SYSTEMS
[Recent Wt Gain (___ Lbs)] : ~T recent [unfilled] lb weight gain [Cough] : cough [Sputum] : sputum [Dyspnea] : dyspnea [SOB on Exertion] : sob on exertion [GERD] : gerd [Negative] : Endocrine

## 2025-02-01 NOTE — PHYSICAL EXAM
[No Acute Distress] : no acute distress [Normal Oropharynx] : normal oropharynx [Normal Appearance] : normal appearance [No Neck Mass] : no neck mass [Normal Rate/Rhythm] : normal rate/rhythm [Normal S1, S2] : normal s1, s2 [No Murmurs] : no murmurs [No Resp Distress] : no resp distress [Rhonchi] : rhonchi [Surgical scars] : surgical scars [Benign] : benign [Normal Gait] : normal gait [No Clubbing] : no clubbing [No Cyanosis] : no cyanosis [No Edema] : no edema [FROM] : FROM [Normal Color/ Pigmentation] : normal color/ pigmentation [No Focal Deficits] : no focal deficits [Oriented x3] : oriented x3 [Normal Affect] : normal affect [TextBox_68] : Decreased breath sounds bilaterally weight-bearing as tolerated

## 2025-02-09 NOTE — HISTORY OF PRESENT ILLNESS
[FreeTextEntry1] :  Patient gave permission for an audio only telehealth visit as he does not have the means to perform a A/V telehealth appointment.  The patient was in his home in [place], [place].  I was in my office in [place], [place].  Subjective:   - Summary: 84-year-old male with a history of bladder tumor resected in 2019, presenting for follow-up. Patient reports good urination with no visible blood, no burning, occasional urgency but no incontinence.   - Chief Complaint (CC): Follow-up for bladder cancer surveillance   - History of Present Illness (HPI): Mr. Prater is an 84-year-old male with a history of bladder tumor discovered and resected in 2019. He received mitomycin treatment post-resection. Follow-up cystoscopies have shown no new tumors. Recent urinalysis on January 13, 2025, showed moderate blood by dipstick, 21 WBC/hpf, and 37 RBC/hpf. Urine culture was negative for growth, and urine cytology was negative for high-grade urothelial carcinoma. Patient reports good urination currently, with no visible hematuria, no dysuria, occasional urgency but no incontinence.   - Past Medical History:     - Bladder tumor (resected in 2019)     - Lung cancer     - 1.5 cm right adrenal gland mass (seen on past CT scan)     - Diabetes mellitus (inferred from HbA1c result)   - Past Surgical History:     - Bladder tumor resection on February 22, 2019   - Family History: Not provided in the conversation   - Social History: Not provided in the conversation   - Review of Systems:     - Urinary: Good urination, no visible hematuria, no dysuria, occasional urgency, no incontinence     - Other systems: Not mentioned in the conversation   - Medications:     - Finasteride 5 mg daily     - Tamsulosin 0.4 mg once daily after a meal     - Silodosin 8 mg with a different meal   - Allergies: Not mentioned in the conversation   Objective:   - Diagnostic Results:     - Urinalysis (January 13, 2025): Moderate blood by dipstick, 21 WBC/hpf, 37 RBC/hpf, no bacteria seen, 1 epithelial cell, nitrites negative     - Urine culture (January 13, 2025): No growth     - Urine cytology (January 13, 2025): Negative for high-grade urothelial carcinoma, some inflammatory cells and RBCs seen, rare benign urothelial cells     - Comprehensive metabolic panel (January 9, 2025): Serum creatinine 0.96, eGFR 77 mL/min/1.73m     - HbA1c (January 9, 2025): 8.3%     - Past CT scan: 1.5 cm right adrenal gland mass noted   - Vital Signs: Not provided in the conversation   Assessment:   - Summary: 84-year-old male with a history of bladder cancer, status post resection in 2019, currently under surveillance. Recent urinalysis shows microscopic hematuria and pyuria, but negative urine culture and cytology. Patient also has poorly controlled diabetes and an incidental adrenal mass.   - Problems:     - History of bladder cancer, currently in remission     - Microscopic hematuria and pyuria     - Poorly controlled diabetes mellitus     - Incidental right adrenal mass     - History of lung cancer     - Lower urinary tract symptoms (managed with medications)   - Differential Diagnosis:     - Recurrent bladder cancer     - Urinary tract infection     - Benign prostatic hyperplasia     - Urolithiasis     - Adrenal incidentaloma   Plan:   - Summary: Continue surveillance for bladder cancer recurrence. Address patient's request for in-person care by recommending another urologist. Maintain current medication regimen for lower urinary tract symptoms.   - Plan:     - Recommend Dr. Galina Neumann at [address] [place] [address] [address] for continued in-person urological care     - Offer telehealth appointments if Dr. Neumann is unavailable     - Continue current medications: Finasteride, Tamsulosin, and Silodosin     - Advise regular follow-up with primary care physician Dr. Analilia Tavarez for diabetes management     - Consider repeat urinalysis and urine cytology in 3-6 months if not performed by new urologist     - Recommend continued surveillance cystoscopies as per standard bladder cancer follow-up protocol     - Suggest monitoring of the adrenal mass with periodic imaging

## 2025-02-09 NOTE — REVIEW OF SYSTEMS
[Feeling Poorly] : not feeling poorly [see HPI] : see HPI [Dysuria] : no dysuria [Confused] : no confusion [Suicidal] : not suicidal [Anxiety] : no anxiety

## 2025-02-09 NOTE — PHYSICAL EXAM
[] : no respiratory distress [Respiration, Rhythm And Depth] : normal respiratory rhythm and effort [Oriented To Time, Place, And Person] : oriented to person, place, and time [Affect] : the affect was normal [Mood] : the mood was normal [Not Anxious] : not anxious [de-identified] : Patient was alert oriented and appropriate.  He did not have dysarthria or word finding problems.

## 2025-02-09 NOTE — HISTORY OF PRESENT ILLNESS
[FreeTextEntry1] :  Patient gave permission for an audio only telehealth visit as he does not have the means to perform a A/V telehealth appointment.  The patient was in his home in [place], [place].  I was in my office in [place], [place].  Subjective:   - Summary: 84-year-old male with a history of bladder tumor resected in 2019, presenting for follow-up. Patient reports good urination with no visible blood, no burning, occasional urgency but no incontinence.   - Chief Complaint (CC): Follow-up for bladder cancer surveillance   - History of Present Illness (HPI): Mr. Prater is an 84-year-old male with a history of bladder tumor discovered and resected in 2019. He received mitomycin treatment post-resection. Follow-up cystoscopies have shown no new tumors. Recent urinalysis on January 13, 2025, showed moderate blood by dipstick, 21 WBC/hpf, and 37 RBC/hpf. Urine culture was negative for growth, and urine cytology was negative for high-grade urothelial carcinoma. Patient reports good urination currently, with no visible hematuria, no dysuria, occasional urgency but no incontinence.   - Past Medical History:     - Bladder tumor (resected in 2019)     - Lung cancer     - 1.5 cm right adrenal gland mass (seen on past CT scan)     - Diabetes mellitus (inferred from HbA1c result)   - Past Surgical History:     - Bladder tumor resection on February 22, 2019   - Family History: Not provided in the conversation   - Social History: Not provided in the conversation   - Review of Systems:     - Urinary: Good urination, no visible hematuria, no dysuria, occasional urgency, no incontinence     - Other systems: Not mentioned in the conversation   - Medications:     - Finasteride 5 mg daily     - Tamsulosin 0.4 mg once daily after a meal     - Silodosin 8 mg with a different meal   - Allergies: Not mentioned in the conversation   Objective:   - Diagnostic Results:     - Urinalysis (January 13, 2025): Moderate blood by dipstick, 21 WBC/hpf, 37 RBC/hpf, no bacteria seen, 1 epithelial cell, nitrites negative     - Urine culture (January 13, 2025): No growth     - Urine cytology (January 13, 2025): Negative for high-grade urothelial carcinoma, some inflammatory cells and RBCs seen, rare benign urothelial cells     - Comprehensive metabolic panel (January 9, 2025): Serum creatinine 0.96, eGFR 77 mL/min/1.73m     - HbA1c (January 9, 2025): 8.3%     - Past CT scan: 1.5 cm right adrenal gland mass noted   - Vital Signs: Not provided in the conversation   Assessment:   - Summary: 84-year-old male with a history of bladder cancer, status post resection in 2019, currently under surveillance. Recent urinalysis shows microscopic hematuria and pyuria, but negative urine culture and cytology. Patient also has poorly controlled diabetes and an incidental adrenal mass.   - Problems:     - History of bladder cancer, currently in remission     - Microscopic hematuria and pyuria     - Poorly controlled diabetes mellitus     - Incidental right adrenal mass     - History of lung cancer     - Lower urinary tract symptoms (managed with medications)   - Differential Diagnosis:     - Recurrent bladder cancer     - Urinary tract infection     - Benign prostatic hyperplasia     - Urolithiasis     - Adrenal incidentaloma   Plan:   - Summary: Continue surveillance for bladder cancer recurrence. Address patient's request for in-person care by recommending another urologist. Maintain current medication regimen for lower urinary tract symptoms.   - Plan:     - Recommend Dr. Galina Neumann at [address] [place] [address] [address] for continued in-person urological care     - Offer telehealth appointments if Dr. Neumann is unavailable     - Continue current medications: Finasteride, Tamsulosin, and Silodosin     - Advise regular follow-up with primary care physician Dr. Analilia Tavarez for diabetes management     - Consider repeat urinalysis and urine cytology in 3-6 months if not performed by new urologist     - Recommend continued surveillance cystoscopies as per standard bladder cancer follow-up protocol     - Suggest monitoring of the adrenal mass with periodic imaging

## 2025-02-09 NOTE — PHYSICAL EXAM
[] : no respiratory distress [Respiration, Rhythm And Depth] : normal respiratory rhythm and effort [Oriented To Time, Place, And Person] : oriented to person, place, and time [Affect] : the affect was normal [Mood] : the mood was normal [Not Anxious] : not anxious [de-identified] : Patient was alert oriented and appropriate.  He did not have dysarthria or word finding problems.

## 2025-02-09 NOTE — ASSESSMENT
[FreeTextEntry1] :  Assessment:   - Summary: 84-year-old male with a history of bladder cancer, status post resection in 2019, currently under surveillance. Recent urinalysis shows microscopic hematuria and pyuria, but negative urine culture and cytology. Patient also has poorly controlled diabetes and an incidental adrenal mass.   - Problems:     - History of bladder cancer, currently in remission     - Microscopic hematuria and pyuria     - Poorly controlled diabetes mellitus     - Incidental right adrenal mass     - History of lung cancer     - Lower urinary tract symptoms (managed with medications)   - Differential Diagnosis:     - Recurrent bladder cancer     - Urinary tract infection     - Benign prostatic hyperplasia     - Urolithiasis     - Adrenal incidentaloma   Plan:   - Summary: Continue surveillance for bladder cancer recurrence. Address patient's request for in-person care by recommending another urologist. Maintain current medication regimen for lower urinary tract symptoms.   - Plan:     - Recommend Dr. Galina Neumann at [address] [place] [address] [address] for continued in-person urological care     - Offer telehealth appointments if Dr. Neumann is unavailable     - Continue current medications: Finasteride, Tamsulosin, and Silodosin     - Advise regular follow-up with primary care physician Dr. Analilia Tavarez for diabetes management     - Consider repeat urinalysis and urine cytology in 3-6 months if not performed by new urologist     - Recommend continued surveillance cystoscopies as per standard bladder cancer follow-up protocol     - Suggest monitoring of the adrenal mass with periodic imaging  Preparation, audio only telehealth visit and coordination of care took 45 minutes

## 2025-02-09 NOTE — ASSESSMENT
[FreeTextEntry1] :  Assessment:   - Summary: 84-year-old male with a history of bladder cancer, status post resection in 2019, currently under surveillance. Recent urinalysis shows microscopic hematuria and pyuria, but negative urine culture and cytology. Patient also has poorly controlled diabetes and an incidental adrenal mass.   - Problems:     - History of bladder cancer, currently in remission     - Microscopic hematuria and pyuria     - Poorly controlled diabetes mellitus     - Incidental right adrenal mass     - History of lung cancer     - Lower urinary tract symptoms (managed with medications)   - Differential Diagnosis:     - Recurrent bladder cancer     - Urinary tract infection     - Benign prostatic hyperplasia     - Urolithiasis     - Adrenal incidentaloma   Plan:   - Summary: Continue surveillance for bladder cancer recurrence. Address patient's request for in-person care by recommending another urologist. Maintain current medication regimen for lower urinary tract symptoms.   - Plan:     - Recommend Dr. Galina Neumann at [address] [place] [address] [address] for continued in-person urological care     - Offer telehealth appointments if Dr. Neumann is unavailable     - Continue current medications: Finasteride, Tamsulosin, and Silodosin     - Advise regular follow-up with primary care physician Dr. Analilia Tavarez for diabetes management     - Consider repeat urinalysis and urine cytology in 3-6 months if not performed by new urologist     - Recommend continued surveillance cystoscopies as per standard bladder cancer follow-up protocol     - Suggest monitoring of the adrenal mass with periodic imaging  Preparation, audio only telehealth visit and coordination of care took 45 minutes

## 2025-02-21 NOTE — PHYSICAL EXAM
[No Acute Distress] : no acute distress [Well Nourished] : well nourished [Well Developed] : well developed [Well-Appearing] : well-appearing [Normal Voice/Communication] : normal voice/communication [Normal Sclera/Conjunctiva] : normal sclera/conjunctiva [PERRL] : pupils equal round and reactive to light [EOMI] : extraocular movements intact [Normal Outer Ear/Nose] : the outer ears and nose were normal in appearance [Normal Oropharynx] : the oropharynx was normal [Normal TMs] : both tympanic membranes were normal [Normal Nasal Mucosa] : the nasal mucosa was normal [No JVD] : no jugular venous distention [Supple] : supple [No Lymphadenopathy] : no lymphadenopathy [Thyroid Normal, No Nodules] : the thyroid was normal and there were no nodules present [No Respiratory Distress] : no respiratory distress  [Clear to Auscultation] : lungs were clear to auscultation bilaterally [No Accessory Muscle Use] : no accessory muscle use [Normal Percussion] : the chest was normal to percussion [Normal Rate] : normal rate  [Regular Rhythm] : with a regular rhythm [Normal S1, S2] : normal S1 and S2 [No Murmur] : no murmur heard [No Carotid Bruits] : no carotid bruits [No Abdominal Bruit] : a ~M bruit was not heard ~T in the abdomen [No Varicosities] : no varicosities [Pedal Pulses Present] : the pedal pulses are present [No Edema] : there was no peripheral edema [No Extremity Clubbing/Cyanosis] : no extremity clubbing/cyanosis [No Palpable Aorta] : no palpable aorta [No Axillary Lymphadenopathy] : no axillary lymphadenopathy [Soft] : abdomen soft [Non Tender] : non-tender [Non-distended] : non-distended [No Masses] : no abdominal mass palpated [No HSM] : no HSM [Normal Bowel Sounds] : normal bowel sounds [Declined Rectal Exam] : declined rectal exam [Urethral Meatus] : meatus normal [Penis Abnormality] : normal uncircumcised penis [Scrotum] : the scrotum was normal [Epididymis] : the epididymides were normal [Testes Tenderness] : no tenderness of the testes [Testes Mass (___cm)] : there were no testicular masses [Normal Supraclavicular Nodes] : no supraclavicular lymphadenopathy [Normal Axillary Nodes] : no axillary lymphadenopathy [Normal Posterior Cervical Nodes] : no posterior cervical lymphadenopathy [Normal Femoral Nodes] : no femoral lymphadenopathy [Normal Anterior Cervical Nodes] : no anterior cervical lymphadenopathy [Normal Inguinal Nodes] : no inguinal lymphadenopathy [No CVA Tenderness] : no CVA  tenderness [No Spinal Tenderness] : no spinal tenderness [Kyphosis] : no kyphosis [Scoliosis] : no scoliosis [No Joint Swelling] : no joint swelling [Grossly Normal Strength/Tone] : grossly normal strength/tone [No Rash] : no rash [No Skin Lesions] : no skin lesions [Normal Gait] : normal gait [Coordination Grossly Intact] : coordination grossly intact [No Focal Deficits] : no focal deficits [Deep Tendon Reflexes (DTR)] : deep tendon reflexes were 2+ and symmetric [Speech Grossly Normal] : speech grossly normal [Memory Grossly Normal] : memory grossly normal [Normal Affect] : the affect was normal [Alert and Oriented x3] : oriented to person, place, and time [Normal Mood] : the mood was normal [Normal Insight/Judgement] : insight and judgment were intact [Comprehensive Foot Exam Normal] : Right and left foot were examined and both feet are normal. No ulcers in either foot. Toes are normal and with full ROM. [de-identified] : With bilateral inguinal hernias reducible [FreeTextEntry1] : Refused rectal examination

## 2025-02-21 NOTE — HISTORY OF PRESENT ILLNESS
[de-identified] : Patient comes for his Medicare annual wellness visit and follow-up of his chronic medical problems. patient feels well without any chest pain, shortness of breath, paroxysmal nocturnal dyspnea orthopnea, wheezing or other symptoms.

## 2025-02-21 NOTE — ASSESSMENT
[FreeTextEntry1] : #1 coronary artery disease, stable. same treatment.  Followed up by cardiology    #2 hypertension,CMP to lab to check electrolytes and kidney function    #3 adult onset diabetes.Same treatment and diabetic diet.a1c to lab    #4 hyperlipidemia.Same medication and low fat diet.Lipids to lab    #5 monitor liver toxicity due to chronic medication use.cmp to lab    #6 low vitamin D.level to lab    #7 Urinary bladder cancer and benign prostatic hypertrophy. Patient followed by the urologist.    #8 history of hyperuricemia and gout. Same treatment. UA to lab.    #9 Hx of gastritis,stable. CBC to lab    #10 Chronic atrial fibrillation, stable. Followed up by the cardiologist    11. Lung cancer.  Advised to be followed by pulmonary     Plan. Patient advised to follow strict diabetic, low fat, low salt and low weight diet. Return after 3 months.totally 45 min spent

## 2025-02-21 NOTE — HEALTH RISK ASSESSMENT
[Very Good] : ~his/her~  mood as very good [FreeTextEntry1] : health [No] : No [No falls in past year] : Patient reported no falls in the past year [1] : 1) Little interest or pleasure doing things for several days (1) [0] : 2) Feeling down, depressed, or hopeless: Not at all (0) [de-identified] : cardiology [de-identified] : None [de-identified] : Low-salt low-fat diabetic [Former] : Former [< 15 Years] : < 15 Years [Change in mental status noted] : No change in mental status noted [None] : None [Alone] : lives alone [Retired] : retired [High School] : high school [Single] : single [Sexually Active] : not sexually active [Feels Safe at Home] : Feels safe at home [Fully functional (bathing, dressing, toileting, transferring, walking, feeding)] : Fully functional (bathing, dressing, toileting, transferring, walking, feeding) [Fully functional (using the telephone, shopping, preparing meals, housekeeping, doing laundry, using] : Fully functional and needs no help or supervision to perform IADLs (using the telephone, shopping, preparing meals, housekeeping, doing laundry, using transportation, managing medications and managing finances) [Reports changes in hearing] : Reports no changes in hearing [Reports changes in vision] : Reports no changes in vision [Reports changes in dental health] : Reports no changes in dental health [Smoke Detector] : smoke detector [Carbon Monoxide Detector] : carbon monoxide detector [Safety elements used in home] : safety elements used in home [Seat Belt] :  uses seat belt [Sunscreen] : uses sunscreen [Travel to Developing Areas] : does not  travel to developing areas [TB Exposure] : is not being exposed to tuberculosis [Caregiver Concerns] : does not have caregiver concerns [AdvancecareDate] : today [No changes since last discussed ___] : No changes since last discussed  [unfilled] [FreeTextEntry4] : Other providers his cardiologist and his urologist

## 2025-02-24 NOTE — PHYSICAL EXAM
[Varicose Veins Of Lower Extremities] : bilaterally [Ankle Swelling On The Right] : mild [1+] : left foot dorsalis pedis 1+ [Skin Turgor] : normal skin turgor [Skin Color & Pigmentation] : normal skin color and pigmentation [] : no rash [Skin Lesions] : no skin lesions [Ankle Swelling (On Exam)] : not present [FreeTextEntry3] : CFT: 3 seconds x10. Temperature gradient; within normal limits. [de-identified] : Muscle strength: 5/5, bilateral.  Mild hammertoe deformities 2 to 4, bilateral.   [Foot Ulcer] : no foot ulcer [Skin Induration] : no skin induration [FreeTextEntry4] : decreased at the 1st MPJ [FreeTextEntry8] : decreased at the 1st MPJ [FreeTextEntry1] : Light touch is intact, bilaterally.  5.07 [place] monofilament wire is intact, bilateral.

## 2025-02-24 NOTE — PHYSICAL EXAM
[Varicose Veins Of Lower Extremities] : bilaterally [Ankle Swelling On The Right] : mild [1+] : left foot dorsalis pedis 1+ [Skin Color & Pigmentation] : normal skin color and pigmentation [Skin Turgor] : normal skin turgor [] : no rash [Skin Lesions] : no skin lesions [Ankle Swelling (On Exam)] : not present [FreeTextEntry3] : CFT: 3 seconds x10. Temperature gradient; within normal limits. [de-identified] : Muscle strength: 5/5, bilateral.  Mild hammertoe deformities 2 to 4, bilateral.   [Foot Ulcer] : no foot ulcer [Skin Induration] : no skin induration [FreeTextEntry4] : decreased at the 1st MPJ [FreeTextEntry8] : decreased at the 1st MPJ [FreeTextEntry1] : Light touch is intact, bilaterally.  5.07 [place] monofilament wire is intact, bilateral.

## 2025-02-24 NOTE — ASSESSMENT
[FreeTextEntry1] : #1 coronary artery disease, stable. same treatment.  Followed up by cardiology    #2 hypertension,CMP to lab to check electrolytes and kidney function    #3 adult onset diabetes.Same treatment and diabetic diet.a1c to lab    #4 hyperlipidemia.Same medication and low fat diet.Lipids to lab    #5 monitor liver toxicity due to chronic medication use.cmp to lab    #6 low vitamin D.level to lab    #7 Urinary bladder cancer and benign prostatic hypertrophy. Patient followed by the urologist.  Have PSA    #8 history of hyperuricemia and gout.  Not symptomatic.  Same treatment. UA to lab.    #9 Hx of gastritis,stable. CBC to lab to check for any chronic blood loss    #10 Chronic atrial fibrillation, stable. Followed up by the cardiologist    11. Lung cancer.  Advised to be followed by pulmonary 12.  History of COPD, stable.  Continue with current inhalers 13.  GE reflux, stable.  Continue with current treatment and diet.  CBC to lab to check for any possible chronic blood loss    Plan. Patient advised to follow strict diabetic, low fat, low salt and low weight diet. Return after 3 months.totally 50 min spent

## 2025-02-24 NOTE — HISTORY OF PRESENT ILLNESS
[de-identified] : Patient comes for his Medicare annual wellness visit and follow-up of his chronic medical problems. patient feels well without any chest pain, shortness of breath, paroxysmal nocturnal dyspnea orthopnea, wheezing or other symptoms.

## 2025-02-24 NOTE — HEALTH RISK ASSESSMENT
[FreeTextEntry1] : health [de-identified] : None [de-identified] : cardiology [de-identified] : Low-salt low-fat diabetic [Change in mental status noted] : No change in mental status noted [Sexually Active] : not sexually active [Reports changes in hearing] : Reports no changes in hearing [Reports changes in vision] : Reports no changes in vision [Reports changes in dental health] : Reports no changes in dental health [Travel to Developing Areas] : does not  travel to developing areas [TB Exposure] : is not being exposed to tuberculosis [Caregiver Concerns] : does not have caregiver concerns [AdvancecareDate] : today [FreeTextEntry4] : Other providers his cardiologist and his urologist

## 2025-02-24 NOTE — ASSESSMENT
[FreeTextEntry1] : Impression: Nail dystrophy (L60.3).  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  IPK (L85.1).  Xerosis (L85.3).  Treatment: Discussed findings and conditions with the patient.  Again, discussed diabetic pedal care.  Avoid walking barefoot.  Usually checks his feet daily for any breaks in skin or any signs of redness or irritation.  Discussed glycemic control, despite he believes all his labs were 'normal" as per the doctor.  Discussed glycemic control of his hemoglobin A1c is elevated.   All dystrophic nails were prepped and manually trimmed with sterile nippers and smoothed with a rotary timothy.  The hyperkeratotic lesions, 2 in total, were prepped and trimmed with a sterile #15 blade.  He was advised to moisturize his feet daily but avoid excessive moisturizer interdigitally.  Patient states that he has creams at home, LacHydrin, which he tries to put on but will be more diligent especially in the winter.  Discussed risks of infections with the patient if there are breaks in the skin.  He states that he understands.   Return: 2.5 - 3 months.  With any redness, pain, problems or concerns, patient is to contact the office.

## 2025-02-24 NOTE — PHYSICAL EXAM
[Varicose Veins Of Lower Extremities] : bilaterally [Ankle Swelling On The Right] : mild [1+] : left foot dorsalis pedis 1+ [Skin Color & Pigmentation] : normal skin color and pigmentation [Skin Turgor] : normal skin turgor [] : no rash [Skin Lesions] : no skin lesions [Ankle Swelling (On Exam)] : not present [FreeTextEntry3] : CFT: 3 seconds x10. Temperature gradient; within normal limits. [de-identified] : Muscle strength: 5/5, bilateral.  Mild hammertoe deformities 2 to 4, bilateral.   [Foot Ulcer] : no foot ulcer [Skin Induration] : no skin induration [FreeTextEntry4] : decreased at the 1st MPJ [FreeTextEntry8] : decreased at the 1st MPJ [FreeTextEntry1] : Light touch is intact, bilaterally.  5.07 [place] monofilament wire is intact, bilateral.

## 2025-02-24 NOTE — PHYSICAL EXAM
[Kyphosis] : no kyphosis [Scoliosis] : no scoliosis [de-identified] : With bilateral inguinal hernias reducible [FreeTextEntry1] : Refused

## 2025-02-24 NOTE — HISTORY OF PRESENT ILLNESS
[FreeTextEntry1] : Patient presents today for cutting of elongated, thickened nails which he cannot cut himself.  Patient is diabetic and saw his doctor in January of 2025.  Last hemoglobin A1c was 8.3%.  He denies any intermittent claudication type symptoms or additional pedal complaints at this time.

## 2025-03-14 NOTE — DISCUSSION/SUMMARY
[FreeTextEntry1] : 85-year-old male with history of OAD and lung CVA complaining of SPRINGER.  Mild CHF evident by exam.  Lasix 40 mg daily for 10 days was prescribed.  Follow-up with his cardiologist was recommended.  He is to continue Breztri and albuterol as before.  GERD treatment was stressed.  He is to follow-up with his PMD as before.

## 2025-03-14 NOTE — REVIEW OF SYSTEMS
[Cough] : no cough [Sputum] : no sputum [Dyspnea] : dyspnea [SOB on Exertion] : sob on exertion [GERD] : gerd [Negative] : Endocrine

## 2025-03-14 NOTE — HISTORY OF PRESENT ILLNESS
[Former] : former [>= 20 pack years] : >= 20 pack years [TextBox_4] : 85-year-old male with history of COPD and lung CA presents complaining of 4-day history of SPRINGER.  The patient denies cough, chest pain, hemoptysis, night sweats or weight loss.  He continues to use Breztri twice daily with rare albuterol use.  Patient has occasional GERD like complaints. [YearQuit] : 2000 [TextBox_29] : Denies snoring, daytime somnolence, apneic episodes, AM headaches

## 2025-03-14 NOTE — PHYSICAL EXAM
[No Acute Distress] : no acute distress [Normal Oropharynx] : normal oropharynx [No Neck Mass] : no neck mass [Normal Rate/Rhythm] : normal rate/rhythm [Normal S1, S2] : normal s1, s2 [No Murmurs] : no murmurs [No Resp Distress] : no resp distress [Rales] : rales [Rhonchi] : rhonchi [Surgical scars] : surgical scars [Benign] : benign [Normal Gait] : normal gait [No Clubbing] : no clubbing [No Cyanosis] : no cyanosis [No Edema] : no edema [FROM] : FROM [Normal Color/ Pigmentation] : normal color/ pigmentation [No Focal Deficits] : no focal deficits [Oriented x3] : oriented x3 [Normal Affect] : normal affect [TextBox_44] : JROGER present [TextBox_68] : Decreased breath sounds bilaterally

## 2025-03-19 NOTE — ASSESSMENT
[FreeTextEntry1] : Diagnosed #1 dyspnea on exertion chronic which became worse recently.  It could be explained by the history of COPD and recent lung operation for cancer of the lung.  Since it could be anginal equivalent advised to see his cardiologist as soon as possible.  Chest pain instructions given.  No clinical evidence of CHF 2.  Hypertension.  To increase amlodipine to 10 mg p.o. once a day and follow strict low-salt diet. 3.  Coronary artery disease, advised to see his cardiologist as soon as possible. 4.  History of paroxysmal atrial fibrillation, in regular sinus rhythm today. 5.  History of COPD, stable.  Continue with current inhalers.  Followed up by pulmonary doctor. Plan.  Return after 2 months.  Total approximately 40 minutes spent

## 2025-03-19 NOTE — PHYSICAL EXAM
[Normal Oropharynx] : the oropharynx was normal [Normal Nasal Mucosa] : the nasal mucosa was normal [No JVD] : no jugular venous distention [No Lymphadenopathy] : no lymphadenopathy [Supple] : supple [Thyroid Normal, No Nodules] : the thyroid was normal and there were no nodules present [No Respiratory Distress] : no respiratory distress  [No Accessory Muscle Use] : no accessory muscle use [Clear to Auscultation] : lungs were clear to auscultation bilaterally [Normal Percussion] : the chest was normal to percussion [Normal Rate] : normal rate  [Regular Rhythm] : with a regular rhythm [Normal S1, S2] : normal S1 and S2 [No Murmur] : no murmur heard [Pedal Pulses Present] : the pedal pulses are present [No Edema] : there was no peripheral edema [No Extremity Clubbing/Cyanosis] : no extremity clubbing/cyanosis [Normal] : affect was normal and insight and judgment were intact [de-identified] : Decreased breath sounds diffuse but without wheezing or rhonchi

## 2025-03-19 NOTE — HISTORY OF PRESENT ILLNESS
[de-identified] : Patient has dyspnea on exertion for long time but recently became worse.  He has no chest pain, shortness of breath at rest, wheezing, cough paroxysmal nocturnal dyspnea  orthopnea or  feet swelling.  Seen recently by the pulmonary doctor who gave him furosemide 40 mg to take daily but he discontinued it after he he took 1 because it made his mouth very dry without helping his dyspnea on exertion

## 2025-03-26 NOTE — HISTORY OF PRESENT ILLNESS
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   He presents today for pulmonary follow-up.  Patient has had significant dyspnea for the last approximately 3 weeks.  He states the dyspnea is chronic, worse with exertion.  He has a significant cough with increased sputum production which is thick.  No reports of hemoptysis.  He reports nighttime diaphoresis, with a Tmax of 99.  No wheezing or chest tightness however he is not at his baseline.  He was previously seen by pulmonary and PMD.  Consideration of potential pulmonary edema.  However was not able to tolerate Lasix.  He follows with cardiology as well.  He has not been hospitalized recently.  No recent ED visits.   Upon ambulation today he desaturated to less than 88%.  He was significantly dyspneic and tachypneic after ambulation.  Improves with rest.  This is not his baseline.  He uses Breztri regularly and albuterol as needed.  He states he really is not at his baseline satchel improved compared to yesterday.

## 2025-03-26 NOTE — PHYSICAL EXAM
[No Acute Distress] : no acute distress [Normal Rate/Rhythm] : normal rate/rhythm [Normal S1, S2] : normal s1, s2 [Oriented x3] : oriented x3 [Normal Affect] : normal affect [TextBox_68] : Occasional wheeze, no crackles, speaking full sentences at rest but tachypneic upon ambulation

## 2025-03-26 NOTE — ASSESSMENT
[FreeTextEntry1] : 85-year-old man with a past medical history of diabetes type 2, hypertension, TIA, CAD status post PCI, bladder cancer s/p resection in 2018, skin cancer of the right forearm status post excision, COPD, invasive papillary adenocarcinoma status post right upper lobe lobectomy on October 4, 2023.  He presents today with significant dyspnea for the last 3 weeks.  He has acute hypoxic respiratory failure, desaturating to less than 88% upon ambulation.    -Supplemental oxygen requested, if significant/severe dyspnea will present to the emergency room sputum culture  - Repeat CT chest requested.  I discussed with him that his CT from November had a 0.8 cm left apical nodule, potential for recurrent malignancy.  Will discuss with Dr. Ace. - prednisone 40 mg p.o. daily x 5 days.  Discussed potential GI upset, and other side effects including increased appetite, - will take with H2 blocker continue inhalers , Breztri and albuterol labs as below   I spent 50 minutes during this encounter. Total time excludes separate billing services.

## 2025-03-26 NOTE — PROCEDURE
[Thoracic Ultrasound] : Thoracic Ultrasound [A line] : A line: Yes [Pleural Effusion] : Pleural Effusion: No [de-identified] : Dyspnea

## 2025-04-24 NOTE — PROCEDURE
[Thoracic Ultrasound] : Thoracic Ultrasound [A line] : A line: Yes [Pleural Effusion] : Pleural Effusion: No [de-identified] : Dyspnea

## 2025-04-24 NOTE — PHYSICAL EXAM
Health Maintenance Due   Topic Date Due   • Hepatitis B Vaccine (1 of 3 - 3-dose series) Never done   • Pneumococcal Vaccine 0-64 (1 - PCV) Never done   • COVID-19 Vaccine (4 - Booster for Moderna series) 05/23/2022       Patient is due for topics as listed above but is not proceeding with Immunization(s) COVID-19, Hep B and Pneumococcal at this time.    [No Acute Distress] : no acute distress [Normal Rate/Rhythm] : normal rate/rhythm [Normal S1, S2] : normal s1, s2 [Oriented x3] : oriented x3 [Normal Affect] : normal affect [TextBox_68] : Occasional wheeze, no crackles, speaking full sentences at rest but tachypneic upon ambulation

## 2025-04-24 NOTE — ASSESSMENT
[FreeTextEntry1] : 85-year-old man with a past medical history of diabetes type 2, hypertension, TIA, CAD status post PCI, bladder cancer s/p resection in 2018, skin cancer of the right forearm status post excision, COPD, invasive papillary adenocarcinoma status post right upper lobe lobectomy on October 4, 2023.  At our last appointment he was treated for COPD exacerbation.  Will prescribe steroids and March 2025.  Shortly after he was admitted to the hospital with multifocal pneumonia.  He is now status post another course of high-dose steroids.  Admission and completing a 10-day course of cefpodoxime.  He states he feels improved.  -Supplemental oxygen present at home, instructed to use with exertion continue inhalers , Breztri and albuterol Surveillance CT chest April 2 with prior apical nodule markedly decreased in size.  Otherwise no significant consolidation.  Repeat CAT scan while admitted showed right-sided multifocal pneumonia.  Will require repeat likely in 3 to 6 months.   I spent 36 minutes during this encounter. Total time excludes separate billing services.

## 2025-04-24 NOTE — HISTORY OF PRESENT ILLNESS
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  Who presents for follow-up.  He was recently admitted for multifocal pneumonia and COPD exacerbation in the first week of April.     He states he feels much improved today.  Not yet back at baseline though better than when he was hospitalized.  He will complete a 10-day course of cefpodoxime.  He is now off high-dose steroids.  He continues on Trelegy. He saturating 89% at rest.  He has supplemental oxygen at home which I have instructed him to use 2 L with ambulation.  Continues on Breztri and albuterol as needed.  Overall feels much improved.

## 2025-04-24 NOTE — PROCEDURE
[Thoracic Ultrasound] : Thoracic Ultrasound [A line] : A line: Yes [Pleural Effusion] : Pleural Effusion: No [de-identified] : Dyspnea

## 2025-05-07 NOTE — PHYSICAL EXAM
[Varicose Veins Of Lower Extremities] : bilaterally [Ankle Swelling On The Right] : mild [1+] : left foot dorsalis pedis 1+ [Skin Color & Pigmentation] : normal skin color and pigmentation [Skin Turgor] : normal skin turgor [] : no rash [Skin Lesions] : no skin lesions [Ankle Swelling (On Exam)] : not present [FreeTextEntry3] : CFT: 3 seconds x10. Temperature gradient; within normal limits. [de-identified] : Muscle strength: 5/5, bilateral.  Mild hammertoe deformities 2 to 4, bilateral.   [Foot Ulcer] : no foot ulcer [Skin Induration] : no skin induration [FreeTextEntry4] : decreased at the 1st MPJ [FreeTextEntry8] : decreased at the 1st MPJ [FreeTextEntry1] : Light touch is intact, bilaterally.  5.07 [place] monofilament wire is intact, bilateral.

## 2025-05-07 NOTE — ASSESSMENT
[FreeTextEntry1] : Impression: Nail dystrophy (L60.3).  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  IPK (L85.1).  Xerosis (L85.3).  Treatment: Strongly encouraged patient to use LacHydrin that he has at home as a moisturizer to prevent further skin dryness and possible skin fissuring in the future.   Discussed the importance of glycemic control, diabetic foot care and neuropathic precautions.  Patient is wearing adequately fitted shoe gear.   The IPK (1) was debrided and reduced uneventfully with sterile #23 blade with relief in pain. All dystrophic nails were wiped with alcohol and trimmed to hygienic length with sterile nippers.   Return: 3 months.  Earlier with any problems.

## 2025-05-07 NOTE — HISTORY OF PRESENT ILLNESS
[FreeTextEntry1] : Patient returns today for at-risk foot care due to diabetes with neuropathy.  His last A1c was 7.8%.  He last saw his PCP in February.  Patient denies any new medical changes.  Denies any other problems with his feet.  Denies intermittent claudication.

## 2025-05-15 NOTE — HISTORY OF PRESENT ILLNESS
[FreeTextEntry1] : Mr. KD JORGE, 85 year old male, former smoker, w/ hx of HTN, DMT2, TIA, mild MI in 2000 s/p 1 stent, bladder CA s/p removal in 2018, skin CA (Rt forearm) s/p excision 05/2023, who presented to PCP c/o SOB, sent for CXR then CT scan.  PET/CT on 2/13/23: - FDG-avid AP window lymph node, SUV 4.6, 1.2 x 0.8 cm, image 86. - FDG-avid left hilar focus on the SUV 2.9, difficult to measure on corresponding CT, image 95. - No FDG-avid right hilar lymphadenopathy evident. - FDG-avid RUL groundglass opacity, SUV 4.2 in the more solid component anteriorly, measuring approximately 4.2 x 2.9 cm, image 77. - Emphysema.  MRI, MRA of the brain, and MRA of the neck on 5/19/23: - MRI BRAIN: No acute infarction. No abnormal enhancement. - MRA BRAIN: Unremarkable. - MRA NECK: Limited study. Unremarkable.  CT Chest on 6/6/23 at [facility]: - Mild interstitial disease is noted, manifested by fine peripheral and groundglass opacities, slightly worse at the lung bases, not significantly changed. - Mild to moderate degree of emphysematous changes are visualized, worse involving the upper lobes. Multiple bilateral calcified granulomas are seen. - a 4.5cm groundglass opacity is visualized within the RUL laterally, which is difficult to measure due to its irregular shape and ill-defined nature, (4: 93), not significantly changed in size. Possible solid components is visualized, measuring approximately 5 mm (4: 89), not definitively seen previously. - a right adrenal nodule is visualized, measuring 1.7 cm and 8 Hounsfield units, compatible with an adenoma, not particularly changed in size, which is benign, not requiring follow-up examinations.  PFTs on 7/17/23: FVC 81%, FEV1 49%  He is s/p Navigational Bronch EBUS biopsy on 8/8/23 by Dr. Schwartz. Path revealed AdenoCA of the RUL lesion. PD-L1 0%. Level 10 LN non-diagnostic. RUL BAL showed atypical findings.  PFTs on 8/22/23: FVC 60%, FEV1 63%, DLCO 73%  Now ~1.5 yrs s/p Rt VATS, R.A., RULobectomy, MLND on 10/4/23. Path revealed invasive papillary adenocarcinoma, 3.5 cm, G2, all margins and LNs are negative, [id number], Stg IA3.  Of note, patient s/p PPM placement beginning of Feb 2024.  CT Angio Chest w/wo on 2/16/24 at [facility]: - no P.E. - post-op changes  - Rt pleural effusion - peribronchial soft tissue thickening in the Rt anterior hilum, suspicious  PET/CT on 4/11/2024: - RUL post surgical changes and scarring  - along with suture line, there is tubular shaped soft tissue nodule on image 58 measuring 2.3x1.5 cm with SUV max 2.4 - there is also linear opacities at the right lower lung which could reflect scarring and additional postsurgical changes with SUV max 2 - small right pleural effusion - 2 mm left upper lobe solid nodule image 59 - emphysema  On 05/02/2024, PET scan reviewed and discussed with patient, stable postop findings. Recommended to repeat CT scan in 6 months for surveillance.   Patient returned from [facility] on Friday (10/11) and was admitted on 10/15/2024 for COPD exacerbation and severe sepsis 2/2 Rhinovirus infection. Patient was started on azithromycin, duonebs, prednisone, and mucinex for COPD exacerbation. F/u with PCP on 10/24/2024, he became slightly better after discharge and then he lapsed. He went to the emergency room on 10/23/2024 and they gave him Mucinex but he continues being symptomatic. He has also some wheezing. PCP gave a course of Azithromycin and Prednisone. On 10/31/2024, she f/u with Dr. Heller, Given persistent complaints and findings prednisone 40 mg daily for 5 days was prescribed.  CT chest on 11/06/2024:  - New 0.8 cm left apical nodule. Recommend follow-up chest CT in 3 months.  Last seen on 11/14/24: pending read on CT scan. Recommended to RTC in 6 months with CT chest w/o contrast. Patient came back from [facility], and had a cold/cough, went to [facility] ED, hospitalized for pleural effusion, s/p IV antibiotics and steroid, had followed up with Dr. Heller.  CT chest on 4/2/25: - post-op changes  - decreased 0.3cm CHELE nodule - unchanged 0.7cm GG nodule in CHELE abutting Lt major fissure - BL calcified nodules  Patient was hospitalized on 04/18/2025 for multifocal PNA, s/p cefepime. F/u with   on 04/24/2025, supplemental oxygen at home. On 04/30/2025, Sputum culture with gram positive branching adebayo - awaiting speciation as per Pulm note. Recommended to repeat CT chest 3-6 months?   CT chest on 04/18/2025: - Post right upper lobectomy. - 3 mm left apical nodule is again appreciated, similar in size compared to 4/20/2025. 7 mm groundglass nodule of the left upper lobe abutting the left major fissure is also again noted. - Confluent right lower lobe opacity and patchy opacities of the bilateral lower lobes, new compared to 4/2/2025 and likely representing multifocal pneumonia. Scarring and calcifications of the bilateral lungs. - Right pleural thickening.   Pt presents today for follow up. Overall, he reports to be feeling well. Denies any chest pain, shortness of breath, cough, or hemoptysis.

## 2025-05-15 NOTE — CONSULT LETTER
[FreeTextEntry2] : Dr. Michael Schwartz (IP/Referring) Dr. Jacoby Heller (Pulm) Dr. Anuradha Sanchez (Hem/Onc) Dr. Edwin Ramos (Uro) Dr. Analilia Tavarez (PCP) [FreeTextEntry3] : Asad Ace MD, MPH  System Director of Thoracic Surgery  Director of Comprehensive Lung and Foregut [place]  Professor Cardiovascular & Thoracic Surgery   [facility] [employer] at [facility] [phone] [place] [address] [place] [address] Tel: [phone] Fax: [phone]

## 2025-05-15 NOTE — ASSESSMENT
[FreeTextEntry1] : Mr. KD JORGE, 85 year old male, former smoker, w/ hx of HTN, DMT2, TIA, mild MI in 2000 s/p 1 stent, bladder CA s/p removal in 2018, skin CA (Rt forearm) s/p excision 05/2023, who presented to PCP c/o SOB, sent for CXR then CT scan.  Now ~1.5 yrs s/p Rt VATS, R.A., RULobectomy, MLND on 10/4/23. Path revealed invasive papillary adenocarcinoma, 3.5 cm, G2, all margins and LNs are negative, [id number], Stg IA3.  Of note, patient s/p PPM placement beginning of Feb 2024.  Patient returned from [facility] on Friday (10/11) and was admitted on 10/15/2024 for COPD exacerbation and severe sepsis 2/2 Rhinovirus infection. Patient was started on azithromycin, duonebs, prednisone, and mucinex for COPD exacerbation. F/u with PCP on 10/24/2024, he became slightly better after discharge and then he lapsed. He went to the emergency room on 10/23/2024 and they gave him Mucinex but he continues being symptomatic. He has also some wheezing. PCP gave a course of Azithromycin and Prednisone. On 10/31/2024, she f/u with Dr. Heller, Given persistent complaints and findings prednisone 40 mg daily for 5 days was prescribed.  CT chest on 11/06/2024:  - New 0.8 cm left apical nodule. Recommend follow-up chest CT in 3 months.  Last seen on 11/14/24: pending read on CT scan. Recommended to RTC in 6 months with CT chest w/o contrast. Patient came back from [facility], and had a cold/cough, went to [facility] ED, hospitalized for pleural effusion, s/p IV antibiotics and steroid, had followed up with Dr. Heller.  CT chest on 4/2/25: - post-op changes  - decreased 0.3cm CHELE nodule - unchanged 0.7cm GG nodule in CHELE abutting Lt major fissure - BL calcified nodules  Patient was hospitalized on 04/18/2025 for multifocal PNA, s/p cefepime. F/u with   on 04/24/2025, supplemental oxygen at home. On 04/30/2025, Sputum culture with gram positive branching adebayo - awaiting speciation as per Pulm note. Recommended to repeat CT chest 3-6 months?   CT chest on 04/18/2025: - Post right upper lobectomy. - 3 mm left apical nodule is again appreciated, similar in size compared to 4/20/2025. 7 mm groundglass nodule of the left upper lobe abutting the left major fissure is also again noted. - Confluent right lower lobe opacity and patchy opacities of the bilateral lower lobes, new compared to 4/2/2025 and likely representing multifocal pneumonia. Scarring and calcifications of the bilateral lungs. - Right pleural thickening.   I have reviewed the patient's medical records and diagnostic images at time of this office consultation and have made the following recommendation: - CT Chest reviewed with patient, recently treated for pneumonia, last scan c/w findings. I would like him to return to clinic in 6 months with repeat CT Chest without contrast for re-evaluation. He is agreeable.   Recommendations reviewed with patient during this office visit, and all questions answered; Patient instructed on the importance of follow up and verbalizes understanding.    I, AICHA Dodge, personally performed the evaluation and management (E/M) services for this established patient. That E/M includes conducting the examination, assessing all new/exacerbated conditions, and establishing a new plan of care. Today, my ACP, Ry العلي NP, was here to observe my evaluation and management services for this new problem/exacerbated condition to be followed going forward.

## 2025-05-15 NOTE — CONSULT LETTER
[FreeTextEntry2] : Dr. Michael Schwartz (IP/Referring) Dr. Jacoby Heller (Pulm) Dr. Anuradha Sanchez (Hem/Onc) Dr. Edwin Ramos (Uro) Dr. Analilia Tavarez (PCP) [FreeTextEntry3] : Asad Ace MD, MPH  System Director of Thoracic Surgery  Director of Comprehensive Lung and Foregut [place]  Professor Cardiovascular & Thoracic Surgery   [facility] [employer] [facility] at [facility] [phone] [place] [address] [place] [address] Tel: [phone] Fax: [phone]

## 2025-05-15 NOTE — ASSESSMENT
[FreeTextEntry1] : Mr. KD JORGE, 85 year old male, former smoker, w/ hx of HTN, DMT2, TIA, mild MI in 2000 s/p 1 stent, bladder CA s/p removal in 2018, skin CA (Rt forearm) s/p excision 05/2023, who presented to PCP c/o SOB, sent for CXR then CT scan.  Now ~1.5 yrs s/p Rt VATS, R.A., RULobectomy, MLND on 10/4/23. Path revealed invasive papillary adenocarcinoma, 3.5 cm, G2, all margins and LNs are negative, [id number], Stg IA3.  Of note, patient s/p PPM placement beginning of Feb 2024.  Patient returned from [facility] on Friday (10/11) and was admitted on 10/15/2024 for COPD exacerbation and severe sepsis 2/2 Rhinovirus infection. Patient was started on azithromycin, duonebs, prednisone, and mucinex for COPD exacerbation. F/u with PCP on 10/24/2024, he became slightly better after discharge and then he lapsed. He went to the emergency room on 10/23/2024 and they gave him Mucinex but he continues being symptomatic. He has also some wheezing. PCP gave a course of Azithromycin and Prednisone. On 10/31/2024, she f/u with Dr. Heller, Given persistent complaints and findings prednisone 40 mg daily for 5 days was prescribed.  CT chest on 11/06/2024:  - New 0.8 cm left apical nodule. Recommend follow-up chest CT in 3 months.  Last seen on 11/14/24: pending read on CT scan. Recommended to RTC in 6 months with CT chest w/o contrast. Patient came back from [facility], and had a cold/cough, went to [facility] ED, hospitalized for pleural effusion, s/p IV antibiotics and steroid, had followed up with Dr. Heller.  CT chest on 4/2/25: - post-op changes  - decreased 0.3cm CHELE nodule - unchanged 0.7cm GG nodule in CHELE abutting Lt major fissure - BL calcified nodules  Patient was hospitalized on 04/18/2025 for multifocal PNA, s/p cefepime. F/u with   on 04/24/2025, supplemental oxygen at home. On 04/30/2025, Sputum culture with gram positive branching adebayo - awaiting speciation as per Pulm note. Recommended to repeat CT chest 3-6 months?   CT chest on 04/18/2025: - Post right upper lobectomy. - 3 mm left apical nodule is again appreciated, similar in size compared to 4/20/2025. 7 mm groundglass nodule of the left upper lobe abutting the left major fissure is also again noted. - Confluent right lower lobe opacity and patchy opacities of the bilateral lower lobes, new compared to 4/2/2025 and likely representing multifocal pneumonia. Scarring and calcifications of the bilateral lungs. - Right pleural thickening.   I have reviewed the patient's medical records and diagnostic images at time of this office consultation and have made the following recommendation: - CT Chest reviewed with patient, recently treated for pneumonia, last scan c/w findings. I would like him to return to clinic in 6 months with repeat CT Chest without contrast for re-evaluation. He is agreeable.   Recommendations reviewed with patient during this office visit, and all questions answered; Patient instructed on the importance of follow up and verbalizes understanding.    I, AICHA Dodge, personally performed the evaluation and management (E/M) services for this established patient. That E/M includes conducting the examination, assessing all new/exacerbated conditions, and establishing a new plan of care. Today, my ACP, Ry العلي NP, was here to observe my evaluation and management services for this new problem/exacerbated condition to be followed going forward.

## 2025-05-15 NOTE — HISTORY OF PRESENT ILLNESS
[FreeTextEntry1] : Mr. KD JORGE, 85 year old male, former smoker, w/ hx of HTN, DMT2, TIA, mild MI in 2000 s/p 1 stent, bladder CA s/p removal in 2018, skin CA (Rt forearm) s/p excision 05/2023, who presented to PCP c/o SOB, sent for CXR then CT scan.  PET/CT on 2/13/23: - FDG-avid AP window lymph node, SUV 4.6, 1.2 x 0.8 cm, image 86. - FDG-avid left hilar focus on the SUV 2.9, difficult to measure on corresponding CT, image 95. - No FDG-avid right hilar lymphadenopathy evident. - FDG-avid RUL groundglass opacity, SUV 4.2 in the more solid component anteriorly, measuring approximately 4.2 x 2.9 cm, image 77. - Emphysema.  MRI, MRA of the brain, and MRA of the neck on 5/19/23: - MRI BRAIN: No acute infarction. No abnormal enhancement. - MRA BRAIN: Unremarkable. - MRA NECK: Limited study. Unremarkable.  CT Chest on 6/6/23 at [facility]: - Mild interstitial disease is noted, manifested by fine peripheral and groundglass opacities, slightly worse at the lung bases, not significantly changed. - Mild to moderate degree of emphysematous changes are visualized, worse involving the upper lobes. Multiple bilateral calcified granulomas are seen. - a 4.5cm groundglass opacity is visualized within the RUL laterally, which is difficult to measure due to its irregular shape and ill-defined nature, (4: 93), not significantly changed in size. Possible solid components is visualized, measuring approximately 5 mm (4: 89), not definitively seen previously. - a right adrenal nodule is visualized, measuring 1.7 cm and 8 Hounsfield units, compatible with an adenoma, not particularly changed in size, which is benign, not requiring follow-up examinations.  PFTs on 7/17/23: FVC 81%, FEV1 49%  He is s/p Navigational Bronch EBUS biopsy on 8/8/23 by Dr. Schwartz. Path revealed AdenoCA of the RUL lesion. PD-L1 0%. Level 10 LN non-diagnostic. RUL BAL showed atypical findings.  PFTs on 8/22/23: FVC 60%, FEV1 63%, DLCO 73%  Now ~1.5 yrs s/p Rt VATS, R.A., RULobectomy, MLND on 10/4/23. Path revealed invasive papillary adenocarcinoma, 3.5 cm, G2, all margins and LNs are negative, [id number], Stg IA3.  Of note, patient s/p PPM placement beginning of Feb 2024.  CT Angio Chest w/wo on 2/16/24 at [place]: - no P.E. - post-op changes  - Rt pleural effusion - peribronchial soft tissue thickening in the Rt anterior hilum, suspicious  PET/CT on 4/11/2024: - RUL post surgical changes and scarring  - along with suture line, there is tubular shaped soft tissue nodule on image 58 measuring 2.3x1.5 cm with SUV max 2.4 - there is also linear opacities at the right lower lung which could reflect scarring and additional postsurgical changes with SUV max 2 - small right pleural effusion - 2 mm left upper lobe solid nodule image 59 - emphysema  On 05/02/2024, PET scan reviewed and discussed with patient, stable postop findings. Recommended to repeat CT scan in 6 months for surveillance.   Patient returned from [facility] on Friday (10/11) and was admitted on 10/15/2024 for COPD exacerbation and severe sepsis 2/2 Rhinovirus infection. Patient was started on azithromycin, duonebs, prednisone, and mucinex for COPD exacerbation. F/u with PCP on 10/24/2024, he became slightly better after discharge and then he lapsed. He went to the emergency room on 10/23/2024 and they gave him Mucinex but he continues being symptomatic. He has also some wheezing. PCP gave a course of Azithromycin and Prednisone. On 10/31/2024, she f/u with Dr. Heller, Given persistent complaints and findings prednisone 40 mg daily for 5 days was prescribed.  CT chest on 11/06/2024:  - New 0.8 cm left apical nodule. Recommend follow-up chest CT in 3 months.  Last seen on 11/14/24: pending read on CT scan. Recommended to RTC in 6 months with CT chest w/o contrast. Patient came back from [facility], and had a cold/cough, went to [facility] ED, hospitalized for pleural effusion, s/p IV antibiotics and steroid, had followed up with Dr. Heller.  CT chest on 4/2/25: - post-op changes  - decreased 0.3cm CHELE nodule - unchanged 0.7cm GG nodule in CHELE abutting Lt major fissure - BL calcified nodules  Patient was hospitalized on 04/18/2025 for multifocal PNA, s/p cefepime. F/u with   on 04/24/2025, supplemental oxygen at home. On 04/30/2025, Sputum culture with gram positive branching adebayo - awaiting speciation as per Pulm note. Recommended to repeat CT chest 3-6 months?   CT chest on 04/18/2025: - Post right upper lobectomy. - 3 mm left apical nodule is again appreciated, similar in size compared to 4/20/2025. 7 mm groundglass nodule of the left upper lobe abutting the left major fissure is also again noted. - Confluent right lower lobe opacity and patchy opacities of the bilateral lower lobes, new compared to 4/2/2025 and likely representing multifocal pneumonia. Scarring and calcifications of the bilateral lungs. - Right pleural thickening.   Pt presents today for follow up. Overall, he reports to be feeling well. Denies any chest pain, shortness of breath, cough, or hemoptysis.

## 2025-05-22 NOTE — ASSESSMENT
[FreeTextEntry1] : 85-year-old man with a past medical history of diabetes type 2, hypertension, TIA, CAD status post PCI, bladder cancer s/p resection in 2018, skin cancer of the right forearm status post excision, COPD, invasive papillary adenocarcinoma status post right upper lobe lobectomy on October 4, 2023.  At our last appointment he was treated for COPD exacerbation.  Will prescribe steroids and March 2025.  Shortly after he was admitted to the hospital with multifocal pneumonia.  He is now status post another course of high-dose steroids.  Admission and completing a 10-day course of cefpodoxime.  He states he feels improved.  -Supplemental oxygen present at home, instructed to use with exertion continue inhalers , Breztri and albuterol - Repeat CAT scan while admitted showed right-sided multifocal pneumonia.    - sputum culture negative for AFB but positive for gram positive branching rods, pending identification.  - reporting subjective fevers at night with occasional Tylenol use - still occurring since April. Given cultures and subjective fevers will recheck CT chest sooner  - pt aware of above, will refer to ID once identified.     I spent 36 minutes during this encounter. Total time excludes separate billing services.

## 2025-05-22 NOTE — HISTORY OF PRESENT ILLNESS
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  Who presents for follow-up.    He was recently admitted for multifocal pneumonia and COPD exacerbation in the first week of April.  He states he feels much improved today.  He completed a  10-day course of cefpodoxime.  He is now off high-dose steroids.  He continues on Trelegy. He saturating 94% at rest.  He has supplemental oxygen at home which I have instructed him to use 2 L with ambulation.  Continues on Breztri and albuterol as needed.  Overall feels much improved.  Sputum cultures

## 2025-05-22 NOTE — PHYSICAL EXAM
[No Acute Distress] : no acute distress [Normal Rate/Rhythm] : normal rate/rhythm [Normal S1, S2] : normal s1, s2 [No Resp Distress] : no resp distress [Clear to Auscultation Bilaterally] : clear to auscultation bilaterally [Oriented x3] : oriented x3 [Normal Affect] : normal affect Initial (On Arrival)

## 2025-05-29 NOTE — HISTORY OF PRESENT ILLNESS
[de-identified] : Patient comes for follow-up of his chronic medical problems.  Recently he was in the hospital because of pneumonia and now he feels well.  He uses oxygen at home especially when he walks.  Followed up by the pulmonary doctor.  He wants to discontinue metformin because bothers his stomach with burning and bloating only when she takes metformin.  Overall patient feels better without any chest pain, shortness of breath, paroxysmal nocturnal dyspnea orthopnea, wheezing or other symptoms.

## 2025-05-29 NOTE — ASSESSMENT
[FreeTextEntry1] : #1 coronary artery disease, stable. same treatment.  Followed up by cardiology    #2 hypertension,CMP to lab to check electrolytes and kidney function    #3 adult onset diabetes.  Discontinue metformin since it bothers his stomach and start sitagliptin 25 mg p.o. once a day.  Follow also diabetic diet, A1c to lab    #4 hyperlipidemia.Same medication and low fat diet.Lipids to lab    #5 monitor liver toxicity due to chronic medication use.cmp to lab    #6 low vitamin D.level to lab    #7 Urinary bladder cancer and benign prostatic hypertrophy. Patient followed by the urologist.    #8 history of hyperuricemia and gout.  Not symptomatic.  Same treatment. UA to lab.    #9 Hx of gastritis,stable. CBC to lab to check for any chronic blood loss    #10 Chronic atrial fibrillation, stable. Followed up by the cardiologist    11. Lung cancer stable.  Followed up by pulmonary 12.  History of COPD, stable.  Continue with current inhalers.  Followed up by the pulmonary doctor 13.  GE reflux, stable.  Continue with current treatment and diet.  CBC to lab to check for any possible chronic blood loss    Plan.  Since I am retiring at the end of June patient referred to another primary care physician of [facility]

## 2025-06-03 NOTE — PHYSICAL EXAM
[Normal Rate] : normal rate  [Normal S1, S2] : normal S1 and S2 [Soft] : abdomen soft [Non Tender] : non-tender [Non-distended] : non-distended [No Masses] : no abdominal mass palpated [No HSM] : no HSM [Normal] : affect was normal and insight and judgment were intact [de-identified] : Irregularly irregular lifting [de-identified] : Right inguinal hernia reducible and not tender [de-identified] : Pain lifting right lower extremity up to 40 degrees

## 2025-06-03 NOTE — HISTORY OF PRESENT ILLNESS
[FreeTextEntry8] : Patient has right groin and right proximal femur[ mostly in the inner side] pain the last 3 weeks which radiates to the low back especially with some body movements.  The last few days became worse.  No rash in the area or injuries to remember.He has known lumbar radiculopathy.

## 2025-06-03 NOTE — ASSESSMENT
[FreeTextEntry1] : 1 Right proximal thigh, groin and low back pain most likely secondary to lumbar radiculopathy.  Take 2 Tylenol 500 mg p.o. 3 times a day as needed.  Orthopedic instructions given.  Have a right hip and lumbar spine x-ray and after that physical therapy.  If pain worse to consider MRI of lumbar spine and possible pain management consult Plan.  Return after 2 to 3 weeks if not better or earlier if worse

## 2025-07-16 NOTE — HISTORY OF PRESENT ILLNESS
[FreeTextEntry1] : 2019 - HG Ta s/p mitomycin BPH - tamsulosin, silodosin, finasteride PSA 11/13/23 was 2.04 2/29/24 - CT urogram multiple trabeculations, no suspicious lesions PVR = 497ml no prior history fo retention nocturia 1/night no complaints

## 2025-07-21 NOTE — ASSESSMENT
[FreeTextEntry1] : Patient presents for lower extremity evaluation in the settings of Diabetes and Peripheral Neuropathy. Exam and evaluation were performed. Discussed the effects of diabetes on the feet.  Patient educated on the effects of PAD and Neuropathy. Diabetic education provided highlighting the need for daily foot checks, ulcer prevention, and the use of accommodating shoes. Treatment rendered as above with debridement performed. I counseled the patient regarding the following: Skin Care: The patient was instructed to apply keratolytic agents such as Amlactin, Duofilm or Mediplast. Expectations: Callus result from frictional rubbing. Calluses respond well if frictional rubbing is discontinued. Contact office if: Calluses fail to improve despite months of treatment. OTC Recommendations: Patient Specific OTC Recommendation: Topical Urea All questions were answered to the patient's satisfaction and understanding. The patient will return in 10-12 weeks for follow up and continued at-risk foot care.

## 2025-07-21 NOTE — HISTORY OF PRESENT ILLNESS
[FreeTextEntry1] : Patient returns today for at-risk foot care due to diabetes with neuropathy.  His last A1c was 8.7% in May 2025.  He last saw his PCP in June 2025.  Patient denies any new medical changes.  Denies any other problems with his feet.  Denies intermittent claudication.

## 2025-07-21 NOTE — PHYSICAL EXAM
[Ankle Swelling (On Exam)] : not present [Varicose Veins Of Lower Extremities] : bilaterally [Ankle Swelling On The Right] : mild [1+] : left foot dorsalis pedis 1+ [FreeTextEntry3] : CFT: 3 seconds x10. Temperature gradient; within normal limits. [de-identified] : Muscle strength: 5/5, bilateral.  Mild hammertoe deformities 2 to 4, bilateral.   [Skin Color & Pigmentation] : normal skin color and pigmentation [Skin Turgor] : normal skin turgor [] : no rash [Skin Lesions] : no skin lesions [Foot Ulcer] : no foot ulcer [Skin Induration] : no skin induration [FreeTextEntry4] : decreased at the 1st MPJ [FreeTextEntry8] : decreased at the 1st MPJ [FreeTextEntry1] : Light touch is intact, bilaterally.  5.07 [place] monofilament wire is intact, bilateral.

## 2025-07-28 NOTE — ASSESSMENT
[FreeTextEntry1] : 85-year-old man with a past medical history of diabetes type 2, hypertension, TIA, CAD status post PCI, bladder cancer s/p resection in 2018, skin cancer of the right forearm status post excision, COPD, invasive papillary adenocarcinoma status post right upper lobe lobectomy on October 4, 2023.  At our last appointment he was treated for COPD exacerbation.  Will prescribe steroids and March 2025.  Shortly after he was admitted to the hospital with multifocal pneumonia.  He is now status post another course of high-dose steroids.  Admission and completing a 10-day course of cefpodoxime.  He states he feels improved.  -Supplemental oxygen present at home, instructed to use with exertion continue inhalers , Breztri and albuterol - Repeat CAT scan  -checked every 6 months with CT surgery -most recent scan with resolved right lower lobe consolidation and no suspicious nodules noted.  In June 2025 - sputum culture negative for AFB but positive for gram positive branching rods,-unable to be identified even by [facility] lab.  Patient is aware.  He otherwise feels well.  continue to check sputum AFB - reporting subjective fevers at night with occasional Tylenol use - still occurring since April -will discuss with PMD - Will check QuantiFERON   I spent 36 minutes during this encounter. Total time excludes separate billing services.

## 2025-07-28 NOTE — HISTORY OF PRESENT ILLNESS
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  Who presents for follow-up.    He was  admitted for multifocal pneumonia and COPD exacerbation in the first week of April 2025.  He states he feels much improved today.  He completed a  10-day course of cefpodoxime.  He is now off high-dose steroids.  He saturating 94% at rest.  He has supplemental oxygen at home which I have instructed him to use 2 L with ambulation.  Continues on Breztri and albuterol as needed.  Overall feels much improved.  Sputum cultures from March 2025 showed gram-positive branching adebayo-lab was unable to identify in [facility] lab was unable to identify also subsequent sputum culture from May 2025 negative to date.  No new complaints today he uses Breztri.  Recently returned from [place].  No change to inhalers at this time.

## 2025-07-28 NOTE — HISTORY OF PRESENT ILLNESS
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  Who presents for follow-up.    He was  admitted for multifocal pneumonia and COPD exacerbation in the first week of April 2025.  He states he feels much improved today.  He completed a  10-day course of cefpodoxime.  He is now off high-dose steroids.  He saturating 94% at rest.  He has supplemental oxygen at home which I have instructed him to use 2 L with ambulation.  Continues on Breztri and albuterol as needed.  Overall feels much improved.  Sputum cultures from March 2025 showed gram-positive branching adebayo-lab was unable to identify in [facility] lab was unable to identify also subsequent sputum culture from May 2025 negative to date.  No new complaints today he uses Breztri.  Recently returned from [place].  No change to inhalers at this time.

## 2025-07-28 NOTE — ASSESSMENT
[FreeTextEntry1] : 85-year-old man with a past medical history of diabetes type 2, hypertension, TIA, CAD status post PCI, bladder cancer s/p resection in 2018, skin cancer of the right forearm status post excision, COPD, invasive papillary adenocarcinoma status post right upper lobe lobectomy on October 4, 2023.  At our last appointment he was treated for COPD exacerbation.  Will prescribe steroids and March 2025.  Shortly after he was admitted to the hospital with multifocal pneumonia.  He is now status post another course of high-dose steroids.  Admission and completing a 10-day course of cefpodoxime.  He states he feels improved.  -Supplemental oxygen present at home, instructed to use with exertion continue inhalers , Breztri and albuterol - Repeat CAT scan  -checked every 6 months with CT surgery -most recent scan with resolved right lower lobe consolidation and no suspicious nodules noted.  In June 2025 - sputum culture negative for AFB but positive for gram positive branching rods,-unable to be identified even by [facility] lab.  Patient is aware.  He otherwise feels well.  continue to check sputum AFB - reporting subjective fevers at night with occasional Tylenol use - still occurring since April -will discuss with PMD - Will check QuantiFERON   I spent 36 minutes during this encounter. Total time excludes separate billing services.

## 2025-07-28 NOTE — PHYSICAL EXAM
[No Acute Distress] : no acute distress [Normal Rate/Rhythm] : normal rate/rhythm [Normal S1, S2] : normal s1, s2 [No Resp Distress] : no resp distress [Clear to Auscultation Bilaterally] : clear to auscultation bilaterally [Oriented x3] : oriented x3 [Normal Affect] : normal affect